# Patient Record
Sex: MALE | Race: BLACK OR AFRICAN AMERICAN | NOT HISPANIC OR LATINO | URBAN - METROPOLITAN AREA
[De-identification: names, ages, dates, MRNs, and addresses within clinical notes are randomized per-mention and may not be internally consistent; named-entity substitution may affect disease eponyms.]

---

## 2020-04-15 DIAGNOSIS — I77.819 AORTIC ECTASIA, UNSPECIFIED SITE: ICD-10-CM

## 2020-06-12 ENCOUNTER — INPATIENT (INPATIENT)
Facility: HOSPITAL | Age: 70
LOS: 4 days | Discharge: HOME HEALTH | End: 2020-06-17
Attending: INTERNAL MEDICINE | Admitting: INTERNAL MEDICINE
Payer: MEDICARE

## 2020-06-12 VITALS
DIASTOLIC BLOOD PRESSURE: 109 MMHG | RESPIRATION RATE: 17 BRPM | OXYGEN SATURATION: 99 % | HEART RATE: 66 BPM | TEMPERATURE: 97 F | SYSTOLIC BLOOD PRESSURE: 148 MMHG

## 2020-06-12 NOTE — ED ADULT TRIAGE NOTE - CHIEF COMPLAINT QUOTE
Pt BIBEMS from home for c.c SOB on exertion, worsening over past 2 days. Pt currently 99% RA. Denies CP. HX HTN, DM, and aortic valve replacement. .

## 2020-06-13 DIAGNOSIS — I50.23 ACUTE ON CHRONIC SYSTOLIC (CONGESTIVE) HEART FAILURE: ICD-10-CM

## 2020-06-13 DIAGNOSIS — R06.02 SHORTNESS OF BREATH: ICD-10-CM

## 2020-06-13 DIAGNOSIS — Z29.9 ENCOUNTER FOR PROPHYLACTIC MEASURES, UNSPECIFIED: ICD-10-CM

## 2020-06-13 DIAGNOSIS — E11.9 TYPE 2 DIABETES MELLITUS WITHOUT COMPLICATIONS: ICD-10-CM

## 2020-06-13 DIAGNOSIS — Z95.2 PRESENCE OF PROSTHETIC HEART VALVE: ICD-10-CM

## 2020-06-13 DIAGNOSIS — I10 ESSENTIAL (PRIMARY) HYPERTENSION: ICD-10-CM

## 2020-06-13 LAB
ALBUMIN SERPL ELPH-MCNC: 3.8 G/DL — SIGNIFICANT CHANGE UP (ref 3.3–5)
ALP SERPL-CCNC: 89 U/L — SIGNIFICANT CHANGE UP (ref 40–120)
ALT FLD-CCNC: 19 U/L — SIGNIFICANT CHANGE UP (ref 4–41)
ANION GAP SERPL CALC-SCNC: 14 MMO/L — SIGNIFICANT CHANGE UP (ref 7–14)
ANION GAP SERPL CALC-SCNC: 15 MMO/L — HIGH (ref 7–14)
APTT BLD: 31.6 SEC — SIGNIFICANT CHANGE UP (ref 27.5–36.3)
AST SERPL-CCNC: 28 U/L — SIGNIFICANT CHANGE UP (ref 4–40)
BASOPHILS # BLD AUTO: 0.05 K/UL — SIGNIFICANT CHANGE UP (ref 0–0.2)
BASOPHILS # BLD AUTO: 0.06 K/UL — SIGNIFICANT CHANGE UP (ref 0–0.2)
BASOPHILS NFR BLD AUTO: 0.7 % — SIGNIFICANT CHANGE UP (ref 0–2)
BASOPHILS NFR BLD AUTO: 0.9 % — SIGNIFICANT CHANGE UP (ref 0–2)
BILIRUB SERPL-MCNC: 2.1 MG/DL — HIGH (ref 0.2–1.2)
BUN SERPL-MCNC: 20 MG/DL — SIGNIFICANT CHANGE UP (ref 7–23)
BUN SERPL-MCNC: 21 MG/DL — SIGNIFICANT CHANGE UP (ref 7–23)
CALCIUM SERPL-MCNC: 9.7 MG/DL — SIGNIFICANT CHANGE UP (ref 8.4–10.5)
CALCIUM SERPL-MCNC: 9.8 MG/DL — SIGNIFICANT CHANGE UP (ref 8.4–10.5)
CHLORIDE SERPL-SCNC: 104 MMOL/L — SIGNIFICANT CHANGE UP (ref 98–107)
CHLORIDE SERPL-SCNC: 104 MMOL/L — SIGNIFICANT CHANGE UP (ref 98–107)
CO2 SERPL-SCNC: 22 MMOL/L — SIGNIFICANT CHANGE UP (ref 22–31)
CO2 SERPL-SCNC: 22 MMOL/L — SIGNIFICANT CHANGE UP (ref 22–31)
CREAT SERPL-MCNC: 1.2 MG/DL — SIGNIFICANT CHANGE UP (ref 0.5–1.3)
CREAT SERPL-MCNC: 1.2 MG/DL — SIGNIFICANT CHANGE UP (ref 0.5–1.3)
EOSINOPHIL # BLD AUTO: 0.02 K/UL — SIGNIFICANT CHANGE UP (ref 0–0.5)
EOSINOPHIL # BLD AUTO: 0.07 K/UL — SIGNIFICANT CHANGE UP (ref 0–0.5)
EOSINOPHIL NFR BLD AUTO: 0.3 % — SIGNIFICANT CHANGE UP (ref 0–6)
EOSINOPHIL NFR BLD AUTO: 0.9 % — SIGNIFICANT CHANGE UP (ref 0–6)
GLUCOSE SERPL-MCNC: 107 MG/DL — HIGH (ref 70–99)
GLUCOSE SERPL-MCNC: 131 MG/DL — HIGH (ref 70–99)
HBA1C BLD-MCNC: 6.5 % — HIGH (ref 4–5.6)
HCT VFR BLD CALC: 48.1 % — SIGNIFICANT CHANGE UP (ref 39–50)
HCT VFR BLD CALC: 48.8 % — SIGNIFICANT CHANGE UP (ref 39–50)
HGB BLD-MCNC: 15.3 G/DL — SIGNIFICANT CHANGE UP (ref 13–17)
HGB BLD-MCNC: 15.5 G/DL — SIGNIFICANT CHANGE UP (ref 13–17)
IMM GRANULOCYTES NFR BLD AUTO: 0.4 % — SIGNIFICANT CHANGE UP (ref 0–1.5)
IMM GRANULOCYTES NFR BLD AUTO: 0.4 % — SIGNIFICANT CHANGE UP (ref 0–1.5)
INR BLD: 1.65 — HIGH (ref 0.88–1.17)
LYMPHOCYTES # BLD AUTO: 0.9 K/UL — LOW (ref 1–3.3)
LYMPHOCYTES # BLD AUTO: 0.93 K/UL — LOW (ref 1–3.3)
LYMPHOCYTES # BLD AUTO: 12.1 % — LOW (ref 13–44)
LYMPHOCYTES # BLD AUTO: 13.2 % — SIGNIFICANT CHANGE UP (ref 13–44)
MAGNESIUM SERPL-MCNC: 2.3 MG/DL — SIGNIFICANT CHANGE UP (ref 1.6–2.6)
MCHC RBC-ENTMCNC: 29.6 PG — SIGNIFICANT CHANGE UP (ref 27–34)
MCHC RBC-ENTMCNC: 29.8 PG — SIGNIFICANT CHANGE UP (ref 27–34)
MCHC RBC-ENTMCNC: 31.8 % — LOW (ref 32–36)
MCHC RBC-ENTMCNC: 31.8 % — LOW (ref 32–36)
MCV RBC AUTO: 93 FL — SIGNIFICANT CHANGE UP (ref 80–100)
MCV RBC AUTO: 93.7 FL — SIGNIFICANT CHANGE UP (ref 80–100)
MONOCYTES # BLD AUTO: 0.54 K/UL — SIGNIFICANT CHANGE UP (ref 0–0.9)
MONOCYTES # BLD AUTO: 0.56 K/UL — SIGNIFICANT CHANGE UP (ref 0–0.9)
MONOCYTES NFR BLD AUTO: 7.3 % — SIGNIFICANT CHANGE UP (ref 2–14)
MONOCYTES NFR BLD AUTO: 7.9 % — SIGNIFICANT CHANGE UP (ref 2–14)
NEUTROPHILS # BLD AUTO: 5.25 K/UL — SIGNIFICANT CHANGE UP (ref 1.8–7.4)
NEUTROPHILS # BLD AUTO: 6.05 K/UL — SIGNIFICANT CHANGE UP (ref 1.8–7.4)
NEUTROPHILS NFR BLD AUTO: 77.3 % — HIGH (ref 43–77)
NEUTROPHILS NFR BLD AUTO: 78.6 % — HIGH (ref 43–77)
NRBC # FLD: 0 K/UL — SIGNIFICANT CHANGE UP (ref 0–0)
NRBC # FLD: 0 K/UL — SIGNIFICANT CHANGE UP (ref 0–0)
NT-PROBNP SERPL-SCNC: 2301 PG/ML — SIGNIFICANT CHANGE UP
PHOSPHATE SERPL-MCNC: 4 MG/DL — SIGNIFICANT CHANGE UP (ref 2.5–4.5)
PLATELET # BLD AUTO: 192 K/UL — SIGNIFICANT CHANGE UP (ref 150–400)
PLATELET # BLD AUTO: 220 K/UL — SIGNIFICANT CHANGE UP (ref 150–400)
PMV BLD: 12.1 FL — SIGNIFICANT CHANGE UP (ref 7–13)
PMV BLD: 12.1 FL — SIGNIFICANT CHANGE UP (ref 7–13)
POTASSIUM SERPL-MCNC: 4.6 MMOL/L — SIGNIFICANT CHANGE UP (ref 3.5–5.3)
POTASSIUM SERPL-MCNC: 4.8 MMOL/L — SIGNIFICANT CHANGE UP (ref 3.5–5.3)
POTASSIUM SERPL-SCNC: 4.6 MMOL/L — SIGNIFICANT CHANGE UP (ref 3.5–5.3)
POTASSIUM SERPL-SCNC: 4.8 MMOL/L — SIGNIFICANT CHANGE UP (ref 3.5–5.3)
PROT SERPL-MCNC: 7.3 G/DL — SIGNIFICANT CHANGE UP (ref 6–8.3)
PROTHROM AB SERPL-ACNC: 19.1 SEC — HIGH (ref 9.8–13.1)
RBC # BLD: 5.17 M/UL — SIGNIFICANT CHANGE UP (ref 4.2–5.8)
RBC # BLD: 5.21 M/UL — SIGNIFICANT CHANGE UP (ref 4.2–5.8)
RBC # FLD: 16.6 % — HIGH (ref 10.3–14.5)
RBC # FLD: 16.6 % — HIGH (ref 10.3–14.5)
SARS-COV-2 RNA SPEC QL NAA+PROBE: SIGNIFICANT CHANGE UP
SODIUM SERPL-SCNC: 140 MMOL/L — SIGNIFICANT CHANGE UP (ref 135–145)
SODIUM SERPL-SCNC: 141 MMOL/L — SIGNIFICANT CHANGE UP (ref 135–145)
TROPONIN T, HIGH SENSITIVITY: 39 NG/L — SIGNIFICANT CHANGE UP (ref ?–14)
TROPONIN T, HIGH SENSITIVITY: 41 NG/L — SIGNIFICANT CHANGE UP (ref ?–14)
TSH SERPL-MCNC: 2.98 UIU/ML — SIGNIFICANT CHANGE UP (ref 0.27–4.2)
WBC # BLD: 6.8 K/UL — SIGNIFICANT CHANGE UP (ref 3.8–10.5)
WBC # BLD: 7.69 K/UL — SIGNIFICANT CHANGE UP (ref 3.8–10.5)
WBC # FLD AUTO: 6.8 K/UL — SIGNIFICANT CHANGE UP (ref 3.8–10.5)
WBC # FLD AUTO: 7.69 K/UL — SIGNIFICANT CHANGE UP (ref 3.8–10.5)

## 2020-06-13 PROCEDURE — 71046 X-RAY EXAM CHEST 2 VIEWS: CPT | Mod: 26

## 2020-06-13 PROCEDURE — 99223 1ST HOSP IP/OBS HIGH 75: CPT

## 2020-06-13 PROCEDURE — 93306 TTE W/DOPPLER COMPLETE: CPT | Mod: 26

## 2020-06-13 RX ORDER — ASPIRIN/CALCIUM CARB/MAGNESIUM 324 MG
81 TABLET ORAL DAILY
Refills: 0 | Status: DISCONTINUED | OUTPATIENT
Start: 2020-06-13 | End: 2020-06-17

## 2020-06-13 RX ORDER — METOPROLOL TARTRATE 50 MG
200 TABLET ORAL DAILY
Refills: 0 | Status: DISCONTINUED | OUTPATIENT
Start: 2020-06-13 | End: 2020-06-17

## 2020-06-13 RX ORDER — ONDANSETRON 8 MG/1
4 TABLET, FILM COATED ORAL EVERY 6 HOURS
Refills: 0 | Status: DISCONTINUED | OUTPATIENT
Start: 2020-06-13 | End: 2020-06-17

## 2020-06-13 RX ORDER — LOSARTAN POTASSIUM 100 MG/1
100 TABLET, FILM COATED ORAL DAILY
Refills: 0 | Status: DISCONTINUED | OUTPATIENT
Start: 2020-06-13 | End: 2020-06-17

## 2020-06-13 RX ORDER — ATORVASTATIN CALCIUM 80 MG/1
10 TABLET, FILM COATED ORAL AT BEDTIME
Refills: 0 | Status: DISCONTINUED | OUTPATIENT
Start: 2020-06-13 | End: 2020-06-17

## 2020-06-13 RX ORDER — ENOXAPARIN SODIUM 100 MG/ML
40 INJECTION SUBCUTANEOUS DAILY
Refills: 0 | Status: DISCONTINUED | OUTPATIENT
Start: 2020-06-13 | End: 2020-06-15

## 2020-06-13 RX ORDER — METOPROLOL TARTRATE 50 MG
1 TABLET ORAL
Qty: 0 | Refills: 0 | DISCHARGE

## 2020-06-13 RX ORDER — ACETAMINOPHEN 500 MG
650 TABLET ORAL EVERY 6 HOURS
Refills: 0 | Status: DISCONTINUED | OUTPATIENT
Start: 2020-06-13 | End: 2020-06-17

## 2020-06-13 RX ORDER — FUROSEMIDE 40 MG
40 TABLET ORAL DAILY
Refills: 0 | Status: DISCONTINUED | OUTPATIENT
Start: 2020-06-13 | End: 2020-06-15

## 2020-06-13 RX ORDER — LOSARTAN POTASSIUM 100 MG/1
50 TABLET, FILM COATED ORAL DAILY
Refills: 0 | Status: DISCONTINUED | OUTPATIENT
Start: 2020-06-13 | End: 2020-06-13

## 2020-06-13 RX ORDER — FUROSEMIDE 40 MG
40 TABLET ORAL ONCE
Refills: 0 | Status: COMPLETED | OUTPATIENT
Start: 2020-06-13 | End: 2020-06-13

## 2020-06-13 RX ORDER — HYDROCHLOROTHIAZIDE 25 MG
12.5 TABLET ORAL DAILY
Refills: 0 | Status: DISCONTINUED | OUTPATIENT
Start: 2020-06-13 | End: 2020-06-15

## 2020-06-13 RX ADMIN — LOSARTAN POTASSIUM 100 MILLIGRAM(S): 100 TABLET, FILM COATED ORAL at 06:53

## 2020-06-13 RX ADMIN — Medication 40 MILLIGRAM(S): at 01:49

## 2020-06-13 RX ADMIN — ATORVASTATIN CALCIUM 10 MILLIGRAM(S): 80 TABLET, FILM COATED ORAL at 21:02

## 2020-06-13 RX ADMIN — Medication 81 MILLIGRAM(S): at 13:00

## 2020-06-13 RX ADMIN — Medication 200 MILLIGRAM(S): at 06:53

## 2020-06-13 RX ADMIN — Medication 12.5 MILLIGRAM(S): at 06:53

## 2020-06-13 RX ADMIN — Medication 40 MILLIGRAM(S): at 06:53

## 2020-06-13 RX ADMIN — ENOXAPARIN SODIUM 40 MILLIGRAM(S): 100 INJECTION SUBCUTANEOUS at 13:00

## 2020-06-13 NOTE — H&P ADULT - HISTORY OF PRESENT ILLNESS
Charlie Walters is a 70 year old man with a history of bioAVR, T2DM, HTN, BPH who presents for shortness of breath.      He reports he has had worsening abdominal fullness, shortness of breath, and lower extremity edema for about a year since he had his prostate surgery last July.  He has noticed some orthopnea and PND during that time.  His symptoms then worsened over the last few days with more shortness of breath even at rest.  He denies any fevers, chills, diarrhea but does not some gassy feeling in his chest/abdomen, constipation, poor appetite.  Due to these symptoms, he presented to [facility] ED for evaluation.    In the ED, he was afebrile, with unremarkable vital signs.  BNP was elevated.  He was given 40 IV lasix and admitted for further management.    On evaluation, he gave the above history.

## 2020-06-13 NOTE — H&P ADULT - PROBLEM SELECTOR PLAN 1
-Highest suspicion for heart failure with reduced ejection fraction.  Given history of aortic valve replacement, could also be issue with valve but lower suspicion.  Other items on the differential but less likely include COVID, PE, PNA which would not fit history as well.  -Obtain TTE to evaluate heart function and valve  -Diurese with lasix 40 IV qd.  Continue ARB for afterload reduction.  Continue metoprolol.  -Telemetry, I&Os, fluid restriction, daily weight

## 2020-06-13 NOTE — CONSULT NOTE ADULT - ATTENDING COMMENTS
Patient seen and examined, agree with the above assessment and plan by KHURRAM Torres.  HPI: 70 year old man with a history of bioAVR 2012, T2DM, HTN, BPH who presents for shortness of breath and increased PHELPS.   ECHO to eval LV function and bioprosthetic valve function  continue IV diuresis  ischemic eval pending echo results

## 2020-06-13 NOTE — H&P ADULT - NSICDXPASTSURGICALHX_GEN_ALL_CORE_FT
PAST SURGICAL HISTORY:  Aortic Valve Replaced 1/10/11    S/P Appendectomy     S/P Hemorrhoidectomy 26 yrs old    S/P Hernia Surgery right, 18 yrs old    S/P T&A

## 2020-06-13 NOTE — ED PROVIDER NOTE - PSH
Aortic Valve Replaced  1/10/11  S/P Appendectomy    S/P Hemorrhoidectomy  26 yrs old  S/P Hernia Surgery  right, 18 yrs old  S/P T&A

## 2020-06-13 NOTE — PROVIDER CONTACT NOTE (OTHER) - ASSESSMENT
patient is A&O4. vitals stable. no complaints of chest pain or headache. patient on tele monitor. tele sent EKG strip to floor at this time

## 2020-06-13 NOTE — H&P ADULT - NSHPREVIEWOFSYSTEMS_GEN_ALL_CORE
ROS:  Constitutional:  (+) none; (-) fevers, chills, sweats, unintentional weight loss, fatigue, sick contacts, recent travel, trauma  Ears/Nose/Mouth/Throat: (+) food stuck sensation even without eating (-) throat pain, rhinorrhea, dysphagia/odynophagia  CV: (+) lower extremity edema, orthopnea, PND (-) chest pain, palpitations,   Resp: (+) shortness of breath, (-)  cough  GI: (+) nausea, vomitting, constipation (-) abdominal pain, diarrhea, melana, hematochezia  : (+) none; (-) dysuria, hematuria  MSK: (+) none; (-) joint pain, joint swelling  Skin: (+) none; (-) rash, new yellowing/darkening of skin  Neuro: (+) none; (-) HA, vision changes, weakness, confusion, lightheadedness/dizziness  Endo: (+) none; (-) heat/cold intolerance, recent skin/hair/nail changes  Heme/Lymph: (+) none; (-) swollen lymph nodes, night sweats

## 2020-06-13 NOTE — H&P ADULT - NSHPLABSRESULTS_GEN_ALL_CORE
Diagnostics reviewed and remarkable for:  Lymphopenia 0.93, INR 1.65, tbili 2.1, trop 41, BNP 2301  CXR personally reviewed, slightly increased interstitial markings, possibly some cephalization, no focal opacities.  No mechanical valve visualized  EKG personally reviewed and NSR with PVC, Q waves in III, F, TWI in V4-6, I, AVL.  TWI/Q waves also in 8/2014 EKG Diagnostics reviewed and remarkable for:  Lymphopenia 0.93, INR 1.65, tbili 2.1, trop 41, BNP 2301  CXR personally reviewed, slightly increased interstitial markings, possibly some cephalization, no focal opacities.  No mechanical valve visualized.  Sternotomy wires present.  EKG personally reviewed and NSR with PVC, Q waves in III, F, TWI in V4-6, I, AVL.  TWI/Q waves also in 8/2014 EKG

## 2020-06-13 NOTE — H&P ADULT - PROBLEM SELECTOR PROBLEM 2
Addended by: DARIELA ADEN on: 2/28/2017 11:41 AM     Modules accepted: Orders     Essential hypertension

## 2020-06-13 NOTE — ED PROVIDER NOTE - PMH
AF (Paroxysmal Atrial Fibrillation)    Allergy, Food  coffee and food colors, rash and pigment formation of trunk  Dizziness  from 10/10  Gastric Ulcer  Hx of  Headache, Migraine  16 yrs old  Heart Murmur    HTN - Hypertension    Pulmonary Tuberculosis  age 18 yrs old, treated

## 2020-06-13 NOTE — ED PROVIDER NOTE - CLINICAL SUMMARY MEDICAL DECISION MAKING FREE TEXT BOX
[place] PGY3: 70M hx of aortic valve replacement, DM HTN presents with a cc of SOB, PHELPS, worsening last x2-3 weeks, a/w episodes of feeling lightheaded and dizzy, a/w nausea and no vomiting, does feel like something is "stuck" in back of throat, does note worsening b/l LE edema over same time as well and worsening abdominal fullness for "last year", no fevers exam vss non toxic appearing w b/l edema c/f chf vs renal disease vs worsening aortic valve dysfnx? will check labs cardiacs cxr consider cdu for echo

## 2020-06-13 NOTE — ED ADULT NURSE REASSESSMENT NOTE - NS ED NURSE REASSESS COMMENT FT1
Report received from FABIAN Verdugo. Pt aaox4. Vital signs reassessed as noted; MD aware of BP. Pt medicated as per MD order. Will continue to monitor.

## 2020-06-13 NOTE — CONSULT NOTE ADULT - ASSESSMENT
70 year old man with a history of bioAVR 2012, T2DM, HTN, BPH who presents for shortness of breath and increased PHELPS.     1. Acute CHF exacerbation   + volume overload on exam  improvement with IV lasix  continue lasix as ordered  check echo   continue bb, arb   ischemic eval pending echo results    2. AS s/p bioAVR  presenting with volume overload  continue diuresis  echo pending     3. HTN  bp elevated   increase hydrochlorothiazide to 25mg daily   c/w current meds    dvt ppx

## 2020-06-13 NOTE — H&P ADULT - NSICDXPASTMEDICALHX_GEN_ALL_CORE_FT
PAST MEDICAL HISTORY:  AF (Paroxysmal Atrial Fibrillation)     Allergy, Food coffee and food colors, rash and pigment formation of trunk    Dizziness from 10/10    Gastric Ulcer Hx of    Headache, Migraine 16 yrs old    Heart Murmur     HTN - Hypertension     Pulmonary Tuberculosis age 18 yrs old, treated

## 2020-06-13 NOTE — ED PROVIDER NOTE - PHYSICAL EXAMINATION
GEN APPEARANCE: WDWN, alert and cooperative, non-toxic appearing and in NAD  HEAD: Atraumatic, normocephalic   EYES: PERRLa, EOMI, vision grossly intact.   EARS: Gross hearing intact.   NOSE: No nasal discharge, no external evidence of epistaxis.   NECK: Supple  CV: RRR, S1S2, no c/r/m/g. No cyanosis or pallor. Extremities warm, well perfused. Cap refill <2 seconds. No bruits.   LUNGS: mild b/l diminished breath sounds.   ABDOMEN: Soft, NTND. No guarding or rebound. No masses.   MSK: Spine appears normal, no spine point tenderness. No CVA ttp. No joint erythema or tenderness. Normal muscular development. Pelvis stable.  EXTREMITIES: b/l +2 pitting peripheral edema. No obvious joint or bony deformity.  NEURO: Alert, follows commands. Weight bearing normal. Speech normal. Sensation and motor normal x4 extremities.   SKIN: Normal color for race, warm, dry and intact. No evidence of rash.  PSYCH: Normal mood and affect.

## 2020-06-13 NOTE — H&P ADULT - NSHPPHYSICALEXAM_GEN_ALL_CORE
Physical exam:  Vital signs reviewed as below:  T(C): 36.7 (06-13-20 @ 01:50), Max: 36.7 (06-13-20 @ 01:50)  HR: 73 (06-13-20 @ 01:50) (66 - 73)  BP: 150/110 (06-13-20 @ 01:50) (148/109 - 150/110)  RR: 18 (06-13-20 @ 01:50) (17 - 18)  SpO2: 100% (06-13-20 @ 01:50) (99% - 100%)  Constitutional-awake, alert, not in acute distress  Neuro-awake, alert, appropriately interactive, moving all extremities,   face symmetric  Eyes-pupils equally round and reactive to light, non icteric, no discharge noted  Ears, nose, mouth, throat-no abnormal discharge, moist mucous membranes, oropharynx clear without noted exudate  CV-regular rate and rhythm, no rubs, murmurs, gallops appreciated, 2+ b/l lower extremity edema  Resp-bibasilar crackles, mildly tachypneic, slightly increased work of breathing   GI-abdomen soft and nontender, no rebound or guarding present  -not examined  MSK-normal range of motion of bilateral elbows and knees, no obvious deformities   Skin-warm, dry, no rash appreciated  Psych-calm, cooperative, normal affect, not attending to internal stimuli

## 2020-06-13 NOTE — CONSULT NOTE ADULT - SUBJECTIVE AND OBJECTIVE BOX
CARDIOLOGY CONSULT - Dr. Velázquez     CHIEF COMPLAINT: sob, song     HPI: 70 year old man with a history of bioAVR 2012, T2DM, HTN, BPH who presents for shortness of breath and increased SONG.  He reports he has had worsening abdominal fullness, shortness of breath, and lower extremity edema for about a year since he had his prostate surgery last July.  He has noticed some orthopnea and PND during that time.  His symptoms then worsened over the last few days, . Pt. states he reports worsening SONG when walking up stares over the past few weeks, and within the last few days, reports SOB with minimal exertion, He states he felt similar prior to his AVR.   In the ED, he was afebrile, with unremarkable vital signs.  BNP was elevated.  He was given 40 IV lasix and admitted for further management.          PAST MEDICAL & SURGICAL HISTORY:  AF (Paroxysmal Atrial Fibrillation)  Dizziness: from 10/10  Allergy, Food: coffee and food colors, rash and pigment formation of trunk  Gastric Ulcer: Hx of  Pulmonary Tuberculosis: age 18 yrs old, treated  Headache, Migraine: 16 yrs old  HTN - Hypertension  Heart Murmur  Aortic Valve Replaced: 1/10/11  S/P Hemorrhoidectomy: 26 yrs old  S/P T&A  S/P Hernia Surgery: right, 18 yrs old  S/P Appendectomy          PREVIOUS DIAGNOSTIC TESTING:    [ ] Echocardiogram:   [ ]  Catheterization: < from: Cardiac Cath Lab (12.08.10 @ 10:54) >    [facility]  Department of Cardiology  [address] [place] [address]  [phone]  Cath Lab Report -- Comprehensive Report  Patient: FLORENCE WETZEL  Study date: 2010  Account number: [id number]  MR number: [id number]  : 1950  Gender: Male  Race:  Amer  Case Physician(s):  Ramiro Taylor M.D.  Fellow:  Marbella Miller M.D.  Referring Physician:  ORIN Harris M.D.  Indications: Cardiac: dyspnea and aortic valve disease.  History: Aortic valve: history of moderate to severe stenosis. The patient  has hypertension. Prior cardiovascular procedures: None.  Procedure:  Left coronary angiography.  Right coronary angiography.  Left heart catheterization with ventriculography.  Right heart catheterization.  Root aortography.  Technique: The risks and alternatives of the procedures and conscious  sedation were explained to the patient and informed consent was obtained.  Cardiac catheterization performed electively. Cath lab room 2.  Right femoral artery access. A 6FR SHEATH PINNACLE was inserted in the  vessel. Right femoral vein access. A 7FR SHEATH PINNACLE was inserted in  the vessel. Left coronary artery angiography. The vessel was injected  utilizing a 5FR FL4.0 EXPO catheter. For proper position, 5FR FL5 EXPO  catheter(s) were also used. Right coronary artery angiography. The vessel  was injected utilizing a 5FR FR4.0 EXPO catheter. Left heart  catheterization. Ventriculography was performed. A FRANKIE PIGTAIL  catheter was utilized. For proper position, 5FR  EXPO catheter(s)  were also used. After recording ascending aortic pressure, the catheter  was advanced across the aortic valve and left ventricular pressure was  recorded. Right heart catheterization. The procedure was performed  utilizing a 7FR SWAN DOMINIQUE catheter under fluoroscopic guidance. Root  aortography. A 5FR  EXPO catheter was placed and contrast was  injected. Contrast given: Omnipaque 97 ml. Fluoroscopy time: 10.6 min.  Medications given: Lidocaine 1 percent, SC.  Ventricles: Global left ventricular function was normal. EF estimated by  contrast ventriculography was 55 %.  Valves: Aortic valve: The valve was evaluated by hemodynamic measurement,  fluoroscopy, left ventriculography, and aortography. There was moderate to  severe aortic stenosis. There was moderate (2+/4+) aortic insufficiency.  Coronary vessels: The coronary circulation is right dominant.  LM:      Ostial LM: There was a discrete 20 % stenosis. The lesion was  smoothly contoured and concentric.  LAD:      Proximal LAD: There was a diffuse 20 % stenosis. The lesion was  irregularly contoured.  Mid LAD: There was a discrete 30 % stenosis just after S1.  Distal LAD: Angiography showed minorluminal irregularities with no flow  limiting lesions.  D1: Angiography showed minor luminal irregularities with no flow limiting  lesions.  D2: Angiography showed minor luminal irregularities with no flow limiting  lesions.  CX:      Circumflex: Angiography showed minor luminal irregularities with  no flow limiting lesions.  OM1: Normal.  RI:  Ramus intermedius: Normal.  RCA:      Mid RCA: There was a tubular 25 % stenosis. The lesion was  smoothly contoured and concentric.  RPDA: Normal.  RPLS:Normal.  Aorta: Ascending aorta: The segment was dilated.  Complications: There were no complications.  Summary:  Summary: Moderate to Severe AS (area 1.2, peak gradient  [id number]). Patient's increasing dyspnea is concerning for  physiological significant AS. Mild CAD. Normal LV function.  Recommendations:  CTS evaluation.  Prepared and signed by  FORTINO Miles    < end of copied text >    [ ] Stress Test:  	    MEDICATIONS:  MEDICATIONS  (STANDING):  aspirin enteric coated 81 milliGRAM(s) Oral daily  atorvastatin 10 milliGRAM(s) Oral at bedtime  enoxaparin Injectable 40 milliGRAM(s) SubCutaneous daily  furosemide   Injectable 40 milliGRAM(s) IV Push daily  hydrochlorothiazide 12.5 milliGRAM(s) Oral daily  losartan 100 milliGRAM(s) Oral daily  metoprolol succinate  milliGRAM(s) Oral daily      FAMILY HISTORY:  FH: diabetes mellitus  FH: stomach cancer      SOCIAL HISTORY:    [x ] Non-smoker  [ ] Smoker  [ ] Alcohol    Allergies    coffee and food color, brownish discoloration of trunk (Rash)  food allergy (Rash)  No Known Drug Allergies    Intolerances    	    REVIEW OF SYSTEMS:  CONSTITUTIONAL: No fever, weight loss, or fatigue  EYES: No eye pain, visual disturbances, or discharge  ENMT:  No difficulty hearing, tinnitus, vertigo; No sinus or throat pain  NECK: No pain or stiffness  RESPIRATORY: No cough, wheezing, chills or hemoptysis; + Shortness of Breath  CARDIOVASCULAR: No chest pain, palpitations, passing out, dizziness, or leg swelling  GASTROINTESTINAL: No abdominal or epigastric pain. No nausea, vomiting, or hematemesis; No diarrhea or constipation. No melena or hematochezia.  GENITOURINARY: No dysuria, frequency, hematuria, or incontinence  NEUROLOGICAL: No headaches, memory loss, loss of strength, numbness, or tremors  SKIN: No itching, burning, rashes, or lesions   	    [x] All others negative	  [ ] Unable to obtain    PHYSICAL EXAM:  T(C): 36.5 (20 @ 06:05), Max: 36.7 (20 @ 01:50)  HR: 83 (20 @ 06:05) (66 - 83)  BP: 161/99 (20 @ 06:05) (148/109 - 161/99)  RR: 18 (20 @ 06:05) (17 - 18)  SpO2: 100% (20 @ 06:05) (99% - 100%)  Wt(kg): --  I&O's Summary    2020 07:  -  2020 07:00  --------------------------------------------------------  IN: 100 mL / OUT: 450 mL / NET: -350 mL    2020 07:  -  2020 09:54  --------------------------------------------------------  IN: 150 mL / OUT: 450 mL / NET: -300 mL        Appearance: Normal	  Psychiatry: A & O x 3, Mood & affect appropriate  HEENT:   Normal oral mucosa, PERRL, EOMI	  Lymphatic: No lymphadenopathy  Cardiovascular: Normal S1 S2,RRR, No JVD  Respiratory: +crackles   Gastrointestinal:  Soft, Non-tender, + BS	  Skin: No rashes, No ecchymoses, No cyanosis	  Neurologic: Non-focal  Extremities: Normal range of motion, No clubbing, +b/l le edema, L>R  Vascular: Peripheral pulses palpable 2+ bilaterally    TELEMETRY: 	    ECG:  	NSR , TWI noted, unchanged from prior EKG 2014  RADIOLOGY: < from: Xray Chest 2 Views PA/Lat (20 @ 00:50) >    IMPRESSION:    Clear lungs.    < end of copied text >    OTHER: 	  	  LABS:	 	    CARDIAC MARKERS:                                  15.3   6.80  )-----------( 192      ( 2020 07:07 )             48.1     06-13    141  |  104  |  21  ----------------------------<  107<H>  4.6   |  22  |  1.20    Ca    9.8      2020 07:07  Phos  4.0     06-13  Mg     2.3     06-13    TPro  7.3  /  Alb  3.8  /  TBili  2.1<H>  /  DBili  x   /  AST  28  /  ALT  19  /  AlkPhos  89      PT/INR - ( 2020 00:07 )   PT: 19.1 SEC;   INR: 1.65          PTT - ( 2020 00:07 )  PTT:31.6 SEC  proBNP: Serum Pro-Brain Natriuretic Peptide: 2301 pg/mL ( @ 00:07)    Lipid Profile:   HgA1c:   TSH: Thyroid Stimulating Hormone, Serum: 2.98 uIU/mL ( @ 07:07)

## 2020-06-13 NOTE — ED PROVIDER NOTE - ATTENDING CONTRIBUTION TO CARE
MD Colbert:  patient seen and evaluated with the resident.  I was present for key portions of the History & Physical, and I agree with the Impression & Plan.  MD Colbert:  71 yo M, c/o 1wk progressively worsening SOB.  Porcine aortic valve repair 8yrs ago at [facility].  Intensity:  cannot do ADLS because so SOB.  Associated Sx: no F/c, no N/V/D, +pedal edema.  No CP.  VS: wnl.  Physical Exam: adult M, mild tachypnea, NCAT, PERRL, EOMI, neck supple, RRR, sternotomy appreciated, CTA B, Abd: s/nd/nt, Ext: 1+bipedal edema, Neuro: AAOx3, gait not assessed.  Impression:  SOB in context of Ao valve repair 8yrs ago is concerning for heart failure from valvular insufficiency.  Plan:  admit for cardiology admit, echo, lasix, reassess.

## 2020-06-13 NOTE — ED ADULT NURSE NOTE - OBJECTIVE STATEMENT
Shannon RN: Patient received in TR C. Patient A&OX4, ambulatory. Patient has hx of AFIB, HTN, Aortic Valve replacement. Patient reports to ED for SOB, worsening over the past few days, worsening with exertion. Patient reports "it feels like there is something stuck in my throat making it hard to swallow". Patient reports bloating of his stomach intermittently over past year, bloating noted to abdomen up palpation. Patient denies any pain or discomfort to chest or abdomen, patient denies nausea, vomiting, diarrhea, fever, chills. Patient placed on cardiac monitor, normal sinus rhythm noted. Patient placed on pulse ox, 99% room air. 20G placed in LT AC, labs collected and sent. MD at bedside for evaluation. Report given to Primary RN.

## 2020-06-13 NOTE — ED PROVIDER NOTE - OBJECTIVE STATEMENT
70M hx of aortic valve replacement, DM HTN presents with a cc of SOB, PHELPS, worsening last x2-3 weeks, a/w episodes of feeling lightheaded and dizzy, a/w nausea and no vomiting, does feel like something is "stuck" in back of throat, does note worsening b/l LE edema over same time as well and worsening abdominal fullness for "last year", no fevers. Intermittent cough. had stress earlier in year that demonstrated "larger heart". Denies /f/c/. Denies headache, syncope, lightheadedness, dizziness. Denies chest pain. Denies dysuria, hematuria, hematochezia, BRBPR, tarry stools, diarrhea, constipation.

## 2020-06-14 LAB
ANION GAP SERPL CALC-SCNC: 13 MMO/L — SIGNIFICANT CHANGE UP (ref 7–14)
BUN SERPL-MCNC: 23 MG/DL — SIGNIFICANT CHANGE UP (ref 7–23)
CALCIUM SERPL-MCNC: 9.3 MG/DL — SIGNIFICANT CHANGE UP (ref 8.4–10.5)
CHLORIDE SERPL-SCNC: 102 MMOL/L — SIGNIFICANT CHANGE UP (ref 98–107)
CO2 SERPL-SCNC: 24 MMOL/L — SIGNIFICANT CHANGE UP (ref 22–31)
CREAT SERPL-MCNC: 1.26 MG/DL — SIGNIFICANT CHANGE UP (ref 0.5–1.3)
GLUCOSE SERPL-MCNC: 106 MG/DL — HIGH (ref 70–99)
HCT VFR BLD CALC: 44.4 % — SIGNIFICANT CHANGE UP (ref 39–50)
HGB BLD-MCNC: 14.7 G/DL — SIGNIFICANT CHANGE UP (ref 13–17)
MAGNESIUM SERPL-MCNC: 2 MG/DL — SIGNIFICANT CHANGE UP (ref 1.6–2.6)
MCHC RBC-ENTMCNC: 30.1 PG — SIGNIFICANT CHANGE UP (ref 27–34)
MCHC RBC-ENTMCNC: 33.1 % — SIGNIFICANT CHANGE UP (ref 32–36)
MCV RBC AUTO: 90.8 FL — SIGNIFICANT CHANGE UP (ref 80–100)
NRBC # FLD: 0 K/UL — SIGNIFICANT CHANGE UP (ref 0–0)
PHOSPHATE SERPL-MCNC: 3.9 MG/DL — SIGNIFICANT CHANGE UP (ref 2.5–4.5)
PLATELET # BLD AUTO: 191 K/UL — SIGNIFICANT CHANGE UP (ref 150–400)
PMV BLD: 11.8 FL — SIGNIFICANT CHANGE UP (ref 7–13)
POTASSIUM SERPL-MCNC: 3.9 MMOL/L — SIGNIFICANT CHANGE UP (ref 3.5–5.3)
POTASSIUM SERPL-SCNC: 3.9 MMOL/L — SIGNIFICANT CHANGE UP (ref 3.5–5.3)
RBC # BLD: 4.89 M/UL — SIGNIFICANT CHANGE UP (ref 4.2–5.8)
RBC # FLD: 16.6 % — HIGH (ref 10.3–14.5)
SARS-COV-2 RNA SPEC QL NAA+PROBE: SIGNIFICANT CHANGE UP
SODIUM SERPL-SCNC: 139 MMOL/L — SIGNIFICANT CHANGE UP (ref 135–145)
WBC # BLD: 6.5 K/UL — SIGNIFICANT CHANGE UP (ref 3.8–10.5)
WBC # FLD AUTO: 6.5 K/UL — SIGNIFICANT CHANGE UP (ref 3.8–10.5)

## 2020-06-14 RX ORDER — LANOLIN ALCOHOL/MO/W.PET/CERES
3 CREAM (GRAM) TOPICAL AT BEDTIME
Refills: 0 | Status: DISCONTINUED | OUTPATIENT
Start: 2020-06-14 | End: 2020-06-17

## 2020-06-14 RX ADMIN — Medication 200 MILLIGRAM(S): at 05:06

## 2020-06-14 RX ADMIN — Medication 40 MILLIGRAM(S): at 05:07

## 2020-06-14 RX ADMIN — Medication 3 MILLIGRAM(S): at 21:07

## 2020-06-14 RX ADMIN — Medication 81 MILLIGRAM(S): at 13:30

## 2020-06-14 RX ADMIN — LOSARTAN POTASSIUM 100 MILLIGRAM(S): 100 TABLET, FILM COATED ORAL at 05:06

## 2020-06-14 RX ADMIN — ATORVASTATIN CALCIUM 10 MILLIGRAM(S): 80 TABLET, FILM COATED ORAL at 21:07

## 2020-06-14 RX ADMIN — Medication 12.5 MILLIGRAM(S): at 05:06

## 2020-06-14 RX ADMIN — ENOXAPARIN SODIUM 40 MILLIGRAM(S): 100 INJECTION SUBCUTANEOUS at 13:30

## 2020-06-14 NOTE — PROGRESS NOTE ADULT - SUBJECTIVE AND OBJECTIVE BOX
CC: no cp/sob    TELEMETRY:     PHYSICAL EXAM:    T(C): 36.8 (06-14-20 @ 05:02), Max: 37.1 (06-13-20 @ 12:59)  HR: 71 (06-14-20 @ 05:02) (67 - 76)  BP: 134/90 (06-14-20 @ 05:02) (119/85 - 147/108)  RR: 14 (06-14-20 @ 05:02) (14 - 19)  SpO2: 100% (06-14-20 @ 05:02) (98% - 100%)  Wt(kg): --  I&O's Summary    13 Jun 2020 07:01  -  14 Jun 2020 07:00  --------------------------------------------------------  IN: 1200 mL / OUT: 2625 mL / NET: -1425 mL        Appearance: Normal	  Cardiovascular: Normal S1 S2,RRR, No JVD, No murmurs  Respiratory: Lungs clear to auscultation	  Gastrointestinal:  Soft, Non-tender, + BS	  Extremities: Normal range of motion, No clubbing, cyanosis or edema  Vascular: Peripheral pulses palpable 2+ bilaterally     LABS:	 	                          14.7   6.50  )-----------( 191      ( 14 Jun 2020 07:12 )             44.4     06-14    139  |  102  |  23  ----------------------------<  106<H>  3.9   |  24  |  1.26    Ca    9.3      14 Jun 2020 07:12  Phos  3.9     06-14  Mg     2.0     06-14    TPro  7.3  /  Alb  3.8  /  TBili  2.1<H>  /  DBili  x   /  AST  28  /  ALT  19  /  AlkPhos  89  06-13      PT/INR - ( 13 Jun 2020 00:07 )   PT: 19.1 SEC;   INR: 1.65          PTT - ( 13 Jun 2020 00:07 )  PTT:31.6 SEC    CARDIAC MARKERS:

## 2020-06-14 NOTE — PROGRESS NOTE ADULT - ASSESSMENT
70 year old man with a history of bioAVR, T2DM, HTN, BPH who presents for shortness of breath.      Problem/Plan - 1:  ·  Problem: Shortness of breath.  Plan: -Highest suspicion for heart failure with reduced ejection fraction.  Given history of aortic valve replacement, could also be issue with valve but lower suspicion.  Other items on the differential but less likely include COVID, PE, PNA which would not fit history as well.  -Obtain TTE to evaluate heart function and valve  -Diurese with lasix 40 IV qd.  Continue ARB for afterload reduction.  Continue metoprolol.  -Telemetry, I&Os, fluid restriction, daily weight.     Problem/Plan - 2:  ·  Problem: Essential hypertension.  Plan: Continue home metop, HCTZ.  Losartan to replace valsartan due to formulary.     Problem/Plan - 3:  ·  Problem: Type 2 diabetes mellitus without complication, without long-term current use of insulin.  Plan: -Hold home oral medications  -SSI, acck, DM diet.   Problem/Plan - 4:  ·  Problem: Aortic valve replaced.  Plan: Hx of bioavr 2011.  TTE to evaluate valve function.     Problem/Plan - 5:  ·  Problem: Need for prophylactic measure.  Plan: Improve score 1.  Lovenox ppx.

## 2020-06-14 NOTE — PROGRESS NOTE ADULT - SUBJECTIVE AND OBJECTIVE BOX
Medicine Progress Note    Patient is a 70y old  Male who presents with a chief complaint of CC: shortness of breath (14 Jun 2020 09:19)      SUBJECTIVE / OVERNIGHT EVENTS:    ADDITIONAL REVIEW OF SYSTEMS:    MEDICATIONS  (STANDING):  aspirin enteric coated 81 milliGRAM(s) Oral daily  atorvastatin 10 milliGRAM(s) Oral at bedtime  enoxaparin Injectable 40 milliGRAM(s) SubCutaneous daily  furosemide   Injectable 40 milliGRAM(s) IV Push daily  hydrochlorothiazide 12.5 milliGRAM(s) Oral daily  losartan 100 milliGRAM(s) Oral daily  metoprolol succinate  milliGRAM(s) Oral daily    MEDICATIONS  (PRN):  acetaminophen   Tablet .. 650 milliGRAM(s) Oral every 6 hours PRN Temp greater or equal to 38C (100.4F), Mild Pain (1 - 3), Moderate Pain (4 - 6), Severe Pain (7 - 10)  ondansetron Injectable 4 milliGRAM(s) IV Push every 6 hours PRN Nausea    CAPILLARY BLOOD GLUCOSE        I&O's Summary    13 Jun 2020 07:01  -  14 Jun 2020 07:00  --------------------------------------------------------  IN: 1200 mL / OUT: 2625 mL / NET: -1425 mL    14 Jun 2020 07:01  -  14 Jun 2020 20:00  --------------------------------------------------------  IN: 0 mL / OUT: 700 mL / NET: -700 mL        PHYSICAL EXAM:  Vital Signs Last 24 Hrs  T(C): 36.4 (14 Jun 2020 17:22), Max: 36.9 (13 Jun 2020 21:01)  T(F): 97.6 (14 Jun 2020 17:22), Max: 98.4 (13 Jun 2020 21:01)  HR: 66 (14 Jun 2020 17:22) (66 - 72)  BP: 129/95 (14 Jun 2020 17:22) (119/85 - 134/90)  BP(mean): --  RR: 17 (14 Jun 2020 17:22) (14 - 17)  SpO2: 100% (14 Jun 2020 17:22) (98% - 100%)  CONSTITUTIONAL: NAD, well-developed, well-groomed  ENMT: Moist oral mucosa, no pharyngeal injection or exudates; normal dentition  RESPIRATORY: Normal respiratory effort; lungs are clear to auscultation bilaterally  CARDIOVASCULAR: Regular rate and rhythm, normal S1 and S2, no murmur/rub/gallop; No lower extremity edema; Peripheral pulses are 2+ bilaterally  ABDOMEN: Nontender to palpation, normoactive bowel sounds, no rebound/guarding; No hepatosplenomegaly  PSYCH: A+O to person, place, and time; affect appropriate  NEUROLOGY: CN 2-12 are intact and symmetric; no gross sensory deficits   SKIN: No rashes; no palpable lesions    LABS:                        14.7   6.50  )-----------( 191      ( 14 Jun 2020 07:12 )             44.4     06-14    139  |  102  |  23  ----------------------------<  106<H>  3.9   |  24  |  1.26    Ca    9.3      14 Jun 2020 07:12  Phos  3.9     06-14  Mg     2.0     06-14    TPro  7.3  /  Alb  3.8  /  TBili  2.1<H>  /  DBili  x   /  AST  28  /  ALT  19  /  AlkPhos  89  06-13    PT/INR - ( 13 Jun 2020 00:07 )   PT: 19.1 SEC;   INR: 1.65          PTT - ( 13 Jun 2020 00:07 )  PTT:31.6 SEC          COVID-19 PCR: NotDetec (14 Jun 2020 10:53)      RADIOLOGY & ADDITIONAL TESTS:  Imaging from Last 24 Hours:    Electrocardiogram/QTc Interval:    COORDINATION OF CARE:  Care Discussed with Consultants/Other Providers:  que

## 2020-06-14 NOTE — PROGRESS NOTE ADULT - ASSESSMENT
70 year old man with a history of bioAVR 2012, T2DM, HTN, BPH who presents for shortness of breath and increased PHELPS.     1. Acute Systolic CHF exacerbation   + volume status improving  continue lasix as ordered  echo reveals severe global LV dysfunction  interval decline from his prior surgery  plan for cardiac cath tomorrow  continue bb, arb       2. AS s/p bioAVR  presenting with volume overload  continue diuresis  echo reveals normal bioprosthetic function    3. HTN  bp elevated   increase hydrochlorothiazide to 25mg daily   c/w current meds    dvt ppx       ACP - Advanced Care Planning    -Advanced care planning discussed with the patient. Advanced care planning forms discussed with the patient and/or family.  Risks, benefits, and alternatives of medical/cardiac procedures were discussed in detail with all questions answered. Up to 30 minutes were spent addressing advanced care planning.

## 2020-06-15 ENCOUNTER — TRANSCRIPTION ENCOUNTER (OUTPATIENT)
Age: 70
End: 2020-06-15

## 2020-06-15 LAB
ANION GAP SERPL CALC-SCNC: 12 MMO/L — SIGNIFICANT CHANGE UP (ref 7–14)
APTT BLD: 31.6 SEC — SIGNIFICANT CHANGE UP (ref 27.5–36.3)
BLD GP AB SCN SERPL QL: NEGATIVE — SIGNIFICANT CHANGE UP
BUN SERPL-MCNC: 26 MG/DL — HIGH (ref 7–23)
CALCIUM SERPL-MCNC: 9.2 MG/DL — SIGNIFICANT CHANGE UP (ref 8.4–10.5)
CHLORIDE SERPL-SCNC: 101 MMOL/L — SIGNIFICANT CHANGE UP (ref 98–107)
CO2 SERPL-SCNC: 24 MMOL/L — SIGNIFICANT CHANGE UP (ref 22–31)
CREAT SERPL-MCNC: 1.3 MG/DL — SIGNIFICANT CHANGE UP (ref 0.5–1.3)
GLUCOSE SERPL-MCNC: 110 MG/DL — HIGH (ref 70–99)
MAGNESIUM SERPL-MCNC: 2 MG/DL — SIGNIFICANT CHANGE UP (ref 1.6–2.6)
PHOSPHATE SERPL-MCNC: 3.9 MG/DL — SIGNIFICANT CHANGE UP (ref 2.5–4.5)
POTASSIUM SERPL-MCNC: 4 MMOL/L — SIGNIFICANT CHANGE UP (ref 3.5–5.3)
POTASSIUM SERPL-SCNC: 4 MMOL/L — SIGNIFICANT CHANGE UP (ref 3.5–5.3)
RH IG SCN BLD-IMP: POSITIVE — SIGNIFICANT CHANGE UP
SODIUM SERPL-SCNC: 137 MMOL/L — SIGNIFICANT CHANGE UP (ref 135–145)

## 2020-06-15 PROCEDURE — 93010 ELECTROCARDIOGRAM REPORT: CPT

## 2020-06-15 RX ORDER — METOPROLOL TARTRATE 50 MG
5 TABLET ORAL ONCE
Refills: 0 | Status: COMPLETED | OUTPATIENT
Start: 2020-06-15 | End: 2020-06-15

## 2020-06-15 RX ORDER — HYDROCHLOROTHIAZIDE 25 MG
25 TABLET ORAL DAILY
Refills: 0 | Status: DISCONTINUED | OUTPATIENT
Start: 2020-06-15 | End: 2020-06-15

## 2020-06-15 RX ORDER — FUROSEMIDE 40 MG
40 TABLET ORAL DAILY
Refills: 0 | Status: DISCONTINUED | OUTPATIENT
Start: 2020-06-16 | End: 2020-06-17

## 2020-06-15 RX ORDER — CLOPIDOGREL BISULFATE 75 MG/1
75 TABLET, FILM COATED ORAL DAILY
Refills: 0 | Status: DISCONTINUED | OUTPATIENT
Start: 2020-06-16 | End: 2020-06-17

## 2020-06-15 RX ORDER — HEPARIN SODIUM 5000 [USP'U]/ML
INJECTION INTRAVENOUS; SUBCUTANEOUS
Qty: 25000 | Refills: 0 | Status: DISCONTINUED | OUTPATIENT
Start: 2020-06-15 | End: 2020-06-16

## 2020-06-15 RX ADMIN — HEPARIN SODIUM 1000 UNIT(S)/HR: 5000 INJECTION INTRAVENOUS; SUBCUTANEOUS at 22:49

## 2020-06-15 RX ADMIN — Medication 200 MILLIGRAM(S): at 17:23

## 2020-06-15 RX ADMIN — LOSARTAN POTASSIUM 100 MILLIGRAM(S): 100 TABLET, FILM COATED ORAL at 17:22

## 2020-06-15 RX ADMIN — Medication 40 MILLIGRAM(S): at 05:56

## 2020-06-15 RX ADMIN — Medication 81 MILLIGRAM(S): at 09:42

## 2020-06-15 RX ADMIN — Medication 3 MILLIGRAM(S): at 22:00

## 2020-06-15 RX ADMIN — Medication 5 MILLIGRAM(S): at 11:30

## 2020-06-15 RX ADMIN — ATORVASTATIN CALCIUM 10 MILLIGRAM(S): 80 TABLET, FILM COATED ORAL at 22:00

## 2020-06-15 NOTE — DIETITIAN INITIAL EVALUATION ADULT. - ADD RECOMMEND
1. Resume PO diet once clinically indicated; Once PO diet resumed, encourage/assist Pt with meals; monitor PO diet tolerance; honor food preferences;    2. Monitor labs, hydration status;       3. Suggest outpatient follow up with appropriate RDN for the purposes of long-term nutrition evaluation and diet education;

## 2020-06-15 NOTE — DISCHARGE NOTE PROVIDER - NSDCFUSCHEDAPPT_GEN_ALL_CORE_FT
FLORENCE WETZEL ; 06/17/2020 ; NPP Rad Cat 450 Opd Lkvl FLORENCE WETZEL ; 06/17/2020 ; P Rad Cat 450 Opd Lkvl

## 2020-06-15 NOTE — PROGRESS NOTE ADULT - ASSESSMENT
70 year old man with a history of bioAVR, T2DM, HTN, BPH who presents for shortness of breath.      Problem/Plan - 1:  ·  Problem: Shortness of breath.  Plan: -diuresis as per cards   -Diurese with lasix 40 IV qd.    - ischemia wuy as per cards     Problem/Plan - 2:  ·  Problem: Essential hypertension.  Plan: Continue home metop, HCTZ.  Losartan to replace valsartan due to formulary.     Problem/Plan - 3:  ·  Problem: Type 2 diabetes mellitus without complication, without long-term current use of insulin.  Plan: -Hold home oral medications  -SSI, acck, DM diet.     Problem/Plan - 4:  ·  Problem: Aortic valve replaced.  Plan: Hx of bioavr 2011.   cards fu

## 2020-06-15 NOTE — DISCHARGE NOTE PROVIDER - NSDCCPCAREPLAN_GEN_ALL_CORE_FT
PRINCIPAL DISCHARGE DIAGNOSIS  Diagnosis: Ischemic cardiomyopathy  Assessment and Plan of Treatment: You came in with shortness of breath. You were seen by cardiology. You were found to have ischemic cardiomyopathy on cardiac cath. You received stents for your heart and were started on Plavix. Please continue plavix as presribed and follow up with cardiology (Dr. Velázquez) as scheduled.  You came in with chest pain. Your ECG and cardiac enzymes were not indicative of any acute ischemic changes. You had an echo done which showed ____. You were seen by cardiology who recommended outpatient follow up.    Return to ED if any new or worsening symptoms such as worsenign headache, dizziness, chest pain, palpitations, shortness of breath, neck pain, jaw pain, arm pain, abdominal pain, nausea, or vomiting.      SECONDARY DISCHARGE DIAGNOSES  Diagnosis: Type 2 diabetes mellitus without complication, without long-term current use of insulin  Assessment and Plan of Treatment: Continue your medication regimen and a consistent carbohydrate diet (Meaning eating the same amount of carbohydrates at the same time each day). Monitor blood glucose levels throughout the day before meals and at bedtime. Record blood sugars and bring to outpatient providers appointment in order to be reviewed by your doctor for management modifications. If your sugars are more than 400 or less than 70 you should contact your PCP immediately. Monitor for signs/symptoms of low blood glucose, such as, dizziness, altered mental status, or cool/clammy skin. In addition, monitor for signs/symptoms of high blood glucose, such as, feeling hot, dry, fatigued, or with increased thirst/urination. Make regular podiatry appointments in order to have feet checked for wounds and uncontrolled toe nail growth to prevent infections, as well as, appointments with an ophthalmologist to monitor your vision.    Diagnosis: Essential hypertension  Assessment and Plan of Treatment: Continue blood pressure medication regimen as prescribed. Monitor for any visual changes, headachesz dizziness, chest pain or shortness of breath and return to ER if any such symptoms arise. Monitor blood pressure regularly. Please follow up with your primary care provider in 2 weeks    Diagnosis: Aortic valve replaced  Assessment and Plan of Treatment: Your bioprosthetic valve appears normal on echocardiogram. Please follow up with cardiology (Dr. Velázquez) as scheduled. PRINCIPAL DISCHARGE DIAGNOSIS  Diagnosis: Ischemic cardiomyopathy  Assessment and Plan of Treatment: You came in with shortness of breath. You were seen by cardiology. You were found to have ischemic cardiomyopathy on cardiac cath. You received stents for your heart and were started on Plavix. Please continue your lasix, blood pressure medications, plavix and aspirin as presribed and follow up with cardiology (Dr. Velázquez) as scheduled.   Return to ED if any new or worsening symptoms such as worsenign headache, dizziness, chest pain, palpitations, shortness of breath, neck pain, jaw pain, arm pain, abdominal pain, nausea, or vomiting.      SECONDARY DISCHARGE DIAGNOSES  Diagnosis: Type 2 diabetes mellitus without complication, without long-term current use of insulin  Assessment and Plan of Treatment: Continue your medication regimen and a consistent carbohydrate diet (Meaning eating the same amount of carbohydrates at the same time each day). Monitor blood glucose levels throughout the day before meals and at bedtime. Record blood sugars and bring to outpatient providers appointment in order to be reviewed by your doctor for management modifications. If your sugars are more than 400 or less than 70 you should contact your PCP immediately. Monitor for signs/symptoms of low blood glucose, such as, dizziness, altered mental status, or cool/clammy skin. In addition, monitor for signs/symptoms of high blood glucose, such as, feeling hot, dry, fatigued, or with increased thirst/urination. Make regular podiatry appointments in order to have feet checked for wounds and uncontrolled toe nail growth to prevent infections, as well as, appointments with an ophthalmologist to monitor your vision.    Diagnosis: Essential hypertension  Assessment and Plan of Treatment: Continue blood pressure medication regimen as prescribed. Monitor for any visual changes, headachesz dizziness, chest pain or shortness of breath and return to ER if any such symptoms arise. Monitor blood pressure regularly. Please follow up with your primary care provider in 2 weeks    Diagnosis: Aortic valve replaced  Assessment and Plan of Treatment: Your bioprosthetic valve appears normal on echocardiogram. Please follow up with cardiology (Dr. Velázquez) as scheduled. PRINCIPAL DISCHARGE DIAGNOSIS  Diagnosis: Acute on chronic congestive heart failure  Assessment and Plan of Treatment: You came in with shortness of breath. You were seen by cardiology. You were found to have cardiomyopathy and cardiac artery disease on cardiac cath. You received stents for your heart and were started on Plavix.   **Please continue your aspirin, plavix, atorvastatin, xarelto, metoprolol, losartan and lasix as prescribed, please do not continue your valsartan/HCTZ**   Return to ED if any new or worsening symptoms such as worsenign headache, dizziness, chest pain, palpitations, shortness of breath, neck pain, jaw pain, arm pain, abdominal pain, nausea, or vomiting.      SECONDARY DISCHARGE DIAGNOSES  Diagnosis: Type 2 diabetes mellitus without complication, without long-term current use of insulin  Assessment and Plan of Treatment: Continue your medication regimen and a consistent carbohydrate diet (Meaning eating the same amount of carbohydrates at the same time each day). Monitor blood glucose levels throughout the day before meals and at bedtime. Record blood sugars and bring to outpatient providers appointment in order to be reviewed by your doctor for management modifications. If your sugars are more than 400 or less than 70 you should contact your PCP immediately. Monitor for signs/symptoms of low blood glucose, such as, dizziness, altered mental status, or cool/clammy skin. In addition, monitor for signs/symptoms of high blood glucose, such as, feeling hot, dry, fatigued, or with increased thirst/urination. Make regular podiatry appointments in order to have feet checked for wounds and uncontrolled toe nail growth to prevent infections, as well as, appointments with an ophthalmologist to monitor your vision.    Diagnosis: Essential hypertension  Assessment and Plan of Treatment: Continue blood pressure medication regimen as prescribed.   **Please continue your aspirin, plavix, atorvastatin, xarelto, metoprolol, losartan and lasix as prescribed, please do not continue your valsartan/HCTZ**  Monitor for any visual changes, headachesz dizziness, chest pain or shortness of breath and return to ER if any such symptoms arise. Monitor blood pressure regularly. Please follow up with your primary care provider in 2 weeks    Diagnosis: Aortic valve replaced  Assessment and Plan of Treatment: Your bioprosthetic valve appears normal on echocardiogram. Please follow up with cardiology (Dr. Velázquez) as scheduled.    Diagnosis: Acute on chronic congestive heart failure  Assessment and Plan of Treatment: You came in with shortness of breath. You were seen by cardiology. You were found to have cardiomyopathy and cardiac artery disease on cardiac cath. You received stents for your heart and were started on Plavix.   **Please continue your aspirin, plavix, xarelto, metoprolol, losartan and lasix as prescribed**   Return to ED if any new or worsening symptoms such as worsenign headache, dizziness, chest pain, palpitations, shortness of breath, neck pain, jaw pain, arm pain, abdominal pain, nausea, or vomiting. PRINCIPAL DISCHARGE DIAGNOSIS  Diagnosis: Acute on chronic congestive heart failure  Assessment and Plan of Treatment: You came in with shortness of breath. You were seen by cardiology. You were found to have cardiomyopathy and cardiac artery disease on cardiac cath. You received stents for your heart and were started on Plavix.   **Please continue your aspirin, plavix, atorvastatin, xarelto, metoprolol, losartan and lasix as prescribed, please do not continue your valsartan/HCTZ**   Return to ED if any new or worsening symptoms such as worsenign headache, dizziness, chest pain, palpitations, shortness of breath, neck pain, jaw pain, arm pain, abdominal pain, nausea, or vomiting.      SECONDARY DISCHARGE DIAGNOSES  Diagnosis: Type 2 diabetes mellitus without complication, without long-term current use of insulin  Assessment and Plan of Treatment: Continue your medication regimen and a consistent carbohydrate diet (Meaning eating the same amount of carbohydrates at the same time each day). Monitor blood glucose levels throughout the day before meals and at bedtime. Record blood sugars and bring to outpatient providers appointment in order to be reviewed by your doctor for management modifications. If your sugars are more than 400 or less than 70 you should contact your PCP immediately. Monitor for signs/symptoms of low blood glucose, such as, dizziness, altered mental status, or cool/clammy skin. In addition, monitor for signs/symptoms of high blood glucose, such as, feeling hot, dry, fatigued, or with increased thirst/urination. Make regular podiatry appointments in order to have feet checked for wounds and uncontrolled toe nail growth to prevent infections, as well as, appointments with an ophthalmologist to monitor your vision.    Diagnosis: Essential hypertension  Assessment and Plan of Treatment: Continue blood pressure medication regimen as prescribed.   **Please continue your aspirin, plavix, atorvastatin, xarelto, metoprolol, losartan and lasix as prescribed, please do not continue your valsartan/HCTZ**  Monitor for any visual changes, headaches dizziness, chest pain or shortness of breath and return to ER if any such symptoms arise. Monitor blood pressure regularly. Please follow up with your primary care provider in 2 weeks    Diagnosis: Aortic valve replaced  Assessment and Plan of Treatment: Your bioprosthetic valve appears normal on echocardiogram. Please follow up with cardiology (Dr. Velázquez) as scheduled.    Diagnosis: Acute on chronic congestive heart failure  Assessment and Plan of Treatment: You came in with shortness of breath. You were seen by cardiology. You were found to have cardiomyopathy and cardiac artery disease on cardiac cath. You received stents for your heart and were started on Plavix.   **Please continue your aspirin, plavix, atorvastatin, xarelto, metoprolol, losartan and lasix as prescribed**   Return to ED if any new or worsening symptoms such as worsenign headache, dizziness, chest pain, palpitations, shortness of breath, neck pain, jaw pain, arm pain, abdominal pain, nausea, or vomiting.

## 2020-06-15 NOTE — DISCHARGE NOTE PROVIDER - NSDCFUADDAPPT_GEN_ALL_CORE_FT
outpatient follow up with appropriate RDN for the purposes of long-term nutrition evaluation and diet education    Please follow up with your primary care provider in 1 to 2 weeks for further care. If you don't have a primary care provider please follow up at our Medicine Clinic at  [address] [place] [address] [phone] or [phone]  (please call to make appointment) f/u scheduled for 6/23 @ 3:15pm     outpatient follow up with appropriate RDN for the purposes of long-term nutrition evaluation and diet education    Please follow up with your primary care provider in 1 to 2 weeks for further care. If you don't have a primary care provider please follow up at our Medicine Clinic at  [address] [place] [address] [phone] or [phone]  (please call to make appointment) Please follow up with cardiology as scheduled 6/23 @ 3:15pm     Follow up with your insurance company to find a local registered dietician to discuss healthy eating options for your heart.    Please follow up with your primary care provider in 1 to 2 weeks for further care. If you don't have a primary care provider please follow up at our Medicine Clinic at  [facility] [place] [address] [phone] or [phone]  (please call to make appointment)

## 2020-06-15 NOTE — PROGRESS NOTE ADULT - SUBJECTIVE AND OBJECTIVE BOX
Patient is a 70y old  Male who presents with a chief complaint of CC: shortness of breath (15 Cisco 2020 16:53)      INTERVAL HPI/OVERNIGHT EVENTS:  T(C): 36.6 (06-15-20 @ 17:15), Max: 36.8 (06-14-20 @ 21:05)  HR: 79 (06-15-20 @ 17:15) (64 - 80)  BP: 137/90 (06-15-20 @ 17:15) (105/71 - 137/90)  RR: 18 (06-15-20 @ 17:15) (16 - 18)  SpO2: 100% (06-15-20 @ 17:15) (95% - 100%)  Wt(kg): --  I&O's Summary    14 Jun 2020 07:01  -  15 Cisco 2020 07:00  --------------------------------------------------------  IN: 200 mL / OUT: 1700 mL / NET: -1500 mL    15 Cisco 2020 07:01  -  15 Cisco 2020 17:46  --------------------------------------------------------  IN: 0 mL / OUT: 1800 mL / NET: -1800 mL        LABS:                        14.7   6.50  )-----------( 191      ( 14 Jun 2020 07:12 )             44.4     06-15    137  |  101  |  26<H>  ----------------------------<  110<H>  4.0   |  24  |  1.30    Ca    9.2      15 Cisco 2020 06:33  Phos  3.9     06-15  Mg     2.0     06-15          CAPILLARY BLOOD GLUCOSE                MEDICATIONS  (STANDING):  aspirin enteric coated 81 milliGRAM(s) Oral daily  atorvastatin 10 milliGRAM(s) Oral at bedtime  enoxaparin Injectable 40 milliGRAM(s) SubCutaneous daily  losartan 100 milliGRAM(s) Oral daily  melatonin 3 milliGRAM(s) Oral at bedtime  metoprolol succinate  milliGRAM(s) Oral daily    MEDICATIONS  (PRN):  acetaminophen   Tablet .. 650 milliGRAM(s) Oral every 6 hours PRN Temp greater or equal to 38C (100.4F), Mild Pain (1 - 3), Moderate Pain (4 - 6), Severe Pain (7 - 10)  ondansetron Injectable 4 milliGRAM(s) IV Push every 6 hours PRN Nausea          PHYSICAL EXAM:  GENERAL: frail  CHEST/LUNG: Clear to percussion bilaterally; No rales, rhonchi, wheezing, or rubs  HEART: Regular rate and rhythm; No murmurs, rubs, or gallops  ABDOMEN: Soft, Nontender, Nondistended; Bowel sounds present  EXTREMITIES:  no edema     Care Discussed with Consultants/Other Providers [ ] YES  [ ] NO

## 2020-06-15 NOTE — CHART NOTE - NSCHARTNOTEFT_GEN_A_CORE
S/p LHC via R radial artery. TR band removed at 3p today but re-enforced for bleeding. Now without active bleeding, slight hematoma with good radial pulse. Will start heparin drip for ACS for untreated LAD. For staged PCI tomorrow.     Talha ROSADO

## 2020-06-15 NOTE — DISCHARGE NOTE PROVIDER - INSTRUCTIONS
Please limit fluid to less than 1.5 Liters a day - approximately 6 cups    low salt low cholesterol diet

## 2020-06-15 NOTE — DISCHARGE NOTE PROVIDER - PROVIDER TOKENS
PROVIDER:[TOKEN:[[id number]]] PROVIDER:[TOKEN:[[id number]]],PROVIDER:[TOKEN:[[id number]],FOLLOWUP:[1 week]] PROVIDER:[TOKEN:[[id number]],SCHEDULEDAPPT:[06/23/2020],SCHEDULEDAPPTTIME:[03:15 PM]],PROVIDER:[TOKEN:[[id number]],FOLLOWUP:[1 week]]

## 2020-06-15 NOTE — DIETITIAN INITIAL EVALUATION ADULT. - OTHER INFO
Pt 69 yo male with T2DM, bioAVR, HTN p/w shortness of breath - per chart. Of note Pt with Heart Failure. RDN attempted to assess, but Pt of the unit @ time of visit. Of note Pt NPO now, plan for cardiac cath today. Of note Pt's HbA1c level 6.5% (6/13). Unable to discuss diet or weight history @ present. No report of chewing or swallowing difficulty; no report of nausea, vomiting or diarrhea @ this time. +BM (6/12) - per flow sheet.

## 2020-06-15 NOTE — DISCHARGE NOTE PROVIDER - CARE PROVIDER_API CALL
Dann Velázquez  CARDIOLOGY  [address]  [place] [address]  Phone: [phone]  Fax: [phone]  Follow Up Time: Dann Velázquez  CARDIOLOGY  [address] [facility] [address]  [place] [address]  Phone: [phone]  Fax: [phone]  Follow Up Time:     Fran Chavira  HOSPICE/PALLIATIVE MEDICINE  [address] [place] [address]  Phone: [phone]  Fax: [phone]  Follow Up Time: 1 week Dann Velázquez  CARDIOLOGY  [address]  [place] [address]  Phone: [phone]  Fax: [phone]  Scheduled Appointment: 06/23/2020 03:15 PM    Fran Chavira  HOSPICE/PALLIATIVE MEDICINE  [address] [place] [address]  Phone: [phone]  Fax: [phone]  Follow Up Time: 1 week

## 2020-06-15 NOTE — DISCHARGE NOTE PROVIDER - HOSPITAL COURSE
70 year old man with a history of bioAVR 2012, T2DM, HTN, BPH who presents for shortness of breath and increased PHELPS.         Severe ischemic CM    - Cards on board    - Highest suspicion for heart failure with reduced ejection fraction    - Given history of aortic valve replacement, could also be issue with valve but lower suspicion    - Other items on the differential but less likely include COVID, PE, PNA which would not fit history as well.    - TTE 6/13 EF 15-20%, mild pHTN. Echo reveals normal bioprostetic function    - continue lasix, ARB, metoprolol    -CATH 6/15 - synergy stent mRCA 90, synergy stent dRCA 95; LCX 60, LAD 80; RRA access - stage II PCI of LAD/LCx 6/16    -ASA/Plavix         HTN    - metoprolol, HCTZ (inc to 25 qd 6/15)    - Losartan to replace valsartan due to formulary        DM2    - hgba1c 6.5    - sliding scale         AS s/p bioAVR    -presenting with volume overload    -continue diuresis    -echo reveals normal bioprosthetic function        On ___ this case was reviewed with  ____, the patient is medically stable and optimized for discharge. All medications were reviewed and prescriptions were sent to mutually agreed upon pharmacy. 70 year old man with a history of bioAVR 2012, T2DM, HTN, BPH who presents for shortness of breath and increased PHELPS.         Severe ischemic CM    - Cards on board    - Highest suspicion for heart failure with reduced ejection fraction    - Given history of aortic valve replacement, could also be issue with valve but lower suspicion    - Other items on the differential but less likely include COVID, PE, PNA which would not fit history as well.    - TTE 6/13 EF 15-20%, mild pHTN. Echo reveals normal bioprostetic function    - continue lasix, ARB, metoprolol    -CATH 6/15 - synergy stent mRCA 90, synergy stent dRCA 95; LCX 60, LAD 80; RRA acces     - stage II PCI of LAD/LCx 6/16 - s/p ROCIO to LAD    -ASA/Plavix     -outpatient cards follow up         HTN    - metoprolol, HCTZ (inc to 25 qd 6/15)    - Losartan to replace valsartan due to formulary        DM2    - hgba1c 6.5    - sliding scale         AS s/p bioAVR    -presenting with volume overload    -continue diuresis    -echo reveals normal bioprosthetic function        On ___ this case was reviewed with  ____, the patient is medically stable and optimized for discharge. All medications were reviewed and prescriptions were sent to mutually agreed upon pharmacy. 70 year old man with a history of bioAVR 2012, T2DM, HTN, BPH who presents for shortness of breath and increased PHELPS.             Severe ischemic CM    - Cards on board    - Highest suspicion for heart failure with reduced ejection fraction    - Given history of aortic valve replacement, could also be issue with valve but lower suspicion    - Other items on the differential but less likely include COVID, PE, PNA which would not fit history as well.    - TTE 6/13 EF 15-20%, mild pHTN. Echo reveals normal bioprostetic function    - continue lasix, ARB, metoprolol    -CATH 6/15 - synergy stent mRCA 90, synergy stent dRCA 95; LCX 60, LAD 80; RRA acces     - stage II PCI of LAD/LCx 6/16 - s/p ROCIO to LAD    -ASA/Plavix     -Volume status improved    -outpatient cards follow up         PAF    -developed PAF episode yesterday prior to cath    -dc hep gtt, start DOAC        AS s/p bioAVR    echo reveals normal bioprosthetic function        CAD s/p PCI     s/p LHC x 2 with ROCIO x 2 to LAD     continue DAP therapy    continue bb         HTN    - metoprolol, HCTZ (inc to 25 qd 6/15)    - Losartan to replace valsartan due to formulary        DM2    - hgba1c 6.5    - sliding scale         AS s/p bioAVR    -presenting with volume overload    -continue diuresis    -echo reveals normal bioprosthetic function        On ___ this case was reviewed with  ____, the patient is medically stable and optimized for discharge. All medications were reviewed and prescriptions were sent to mutually agreed upon pharmacy. 70 year old man with a history of bioAVR 2012, T2DM, HTN, BPH who presents for shortness of breath and increased PHELPS.         Acute congestive heart failure exacerbation    - Pt. with history of AS s/p bioAVR    - Cardiology consulted    - COVID swab negative x 2    - TTE 6/13 with ejection fraction 15-20%, mild pHTN. Echo reveals normal bioprosthetic function    - started IV lasix, ARB, continued metoprolol. Transitioned to PO Lasix    - Pt. underwent cardiac cath 6/15 - synergy stent placed in mRCA, synergy stent placed in dRCA. Stage II PCI of LAD/LCx done 6/16 with ROCIO placed in LAD    - Pt. was started on a heparin gtts which was discontinued at time of discharge    - Plavix started inpatient. Aspirin was continued    - Volume status improved    - outpatient cardiology follow up scheduled 6/23 @ 3:15pm     - Pt. cleared for discharge from cardiology standpoint        paroxysmal afib    Pt. developed an episode of paroxysmal a fib yesterday prior to cath    -Pt. was started on a heparin gtts which was discontinued at time of discharge    -Pt. will start Xarelto 15mg qd on discharge         AS s/p bioAVR    -Pt. was diuresed inpatient with improvement of volume status    -echo reveals normal bioprosthetic function        CAD s/p PCI     -s/p PCI with ROCIO prox RCA, RPDA and ROCIO LAD    -continue DAP therapy    -continue metoprolol, losartan, lasix, aspirin, plavix, statin and xarelto on discharge        HTN    - will continue metoprolol, lasix and losartan on discharge. Pt. to not take Valsartan/HCTZ        On 6/17/2020 this case was reviewed with Dr. Velázquez and Dr. Chavira, the patient is medically stable and optimized for discharge. All medications were reviewed and prescriptions were sent to mutually agreed upon pharmacy.

## 2020-06-15 NOTE — DISCHARGE NOTE PROVIDER - NSDCMRMEDTOKEN_GEN_ALL_CORE_FT
aspirin 81 mg oral tablet: 1 tab(s) orally once a day  atorvastatin 10 mg oral tablet: 1 tab(s) orally once a day  glipiZIDE 2.5 mg oral tablet, extended release: 1 tab(s) orally once a day  metFORMIN 500 mg oral tablet: 1 tab(s) orally 2 times a day  metoprolol succinate 200 mg oral tablet, extended release: 1 tab(s) orally once a day  valsartan-hydrochlorothiazide 160mg-12.5mg oral tablet: 1 tab(s) orally once a day aspirin 81 mg oral tablet: 1 tab(s) orally once a day  atorvastatin 10 mg oral tablet: 1 tab(s) orally once a day  glipiZIDE 2.5 mg oral tablet, extended release: 1 tab(s) orally once a day  metFORMIN 500 mg oral tablet: 1 tab(s) orally 2 times a day  metoprolol succinate 200 mg oral tablet, extended release: 1 tab(s) orally once a day  rivaroxaban 15 mg oral tablet: 1 tab(s) orally once a day (in the evening)   please check coverage [phone]  valsartan-hydrochlorothiazide 160mg-12.5mg oral tablet: 1 tab(s) orally once a day aspirin 81 mg oral tablet: 1 tab(s) orally once a day  atorvastatin 10 mg oral tablet: 1 tab(s) orally once a day  Eliquis 5 mg oral tablet: 1 tab(s) orally 2 times a day   please price check [id number],  glipiZIDE 2.5 mg oral tablet, extended release: 1 tab(s) orally once a day  metFORMIN 500 mg oral tablet: 1 tab(s) orally 2 times a day  metoprolol succinate 200 mg oral tablet, extended release: 1 tab(s) orally once a day  rivaroxaban 15 mg oral tablet: 1 tab(s) orally once a day (in the evening)   please check coverage [phone]  valsartan-hydrochlorothiazide 160mg-12.5mg oral tablet: 1 tab(s) orally once a day aspirin 81 mg oral tablet: 1 tab(s) orally once a day  atorvastatin 10 mg oral tablet: 1 tab(s) orally once a day  Eliquis 5 mg oral tablet: 1 tab(s) orally 2 times a day   please price check [phone],  glipiZIDE 2.5 mg oral tablet, extended release: 1 tab(s) orally once a day  metFORMIN 500 mg oral tablet: 1 tab(s) orally 2 times a day  metoprolol succinate 200 mg oral tablet, extended release: 1 tab(s) orally once a day  rivaroxaban 15 mg oral tablet: 1 tab(s) orally once a day (in the evening)   please check coverage [phone]  valsartan-hydrochlorothiazide 160mg-12.5mg oral tablet: 1 tab(s) orally once a day aspirin 81 mg oral tablet: 1 tab(s) orally once a day  atorvastatin 10 mg oral tablet: 1 tab(s) orally once a day  clopidogrel 75 mg oral tablet: 1 tab(s) orally once a day  furosemide 40 mg oral tablet: 1 tab(s) orally once a day  glipiZIDE 2.5 mg oral tablet, extended release: 1 tab(s) orally once a day  losartan 100 mg oral tablet: 1 tab(s) orally once a day  melatonin 3 mg oral tablet: 1 tab(s) orally once a day (at bedtime)  metFORMIN 500 mg oral tablet: 1 tab(s) orally 2 times a day  metoprolol succinate 200 mg oral tablet, extended release: 1 tab(s) orally once a day  rivaroxaban 15 mg oral tablet: 1 tab(s) orally once a day (in the evening)   please check coverage [phone] aspirin 81 mg oral tablet: 1 tab(s) orally once a day  atorvastatin 10 mg oral tablet: 1 tab(s) orally once a day  clopidogrel 75 mg oral tablet: 1 tab(s) orally once a day  furosemide 40 mg oral tablet: 1 tab(s) orally once a day  glipiZIDE 2.5 mg oral tablet, extended release: 1 tab(s) orally once a day  losartan 100 mg oral tablet: 1 tab(s) orally once a day  melatonin 3 mg oral tablet: 1 tab(s) orally once a day (at bedtime)  metFORMIN 500 mg oral tablet: 1 tab(s) orally 2 times a day  metoprolol succinate 200 mg oral tablet, extended release: 1 tab(s) orally once a day  rivaroxaban 15 mg oral tablet: 1 tab(s) orally once a day (in the evening)   please check coverage [phone]

## 2020-06-16 LAB
ANION GAP SERPL CALC-SCNC: 13 MMO/L — SIGNIFICANT CHANGE UP (ref 7–14)
APTT BLD: 65.9 SEC — HIGH (ref 27.5–36.3)
BASOPHILS # BLD AUTO: 0.04 K/UL — SIGNIFICANT CHANGE UP (ref 0–0.2)
BASOPHILS NFR BLD AUTO: 0.7 % — SIGNIFICANT CHANGE UP (ref 0–2)
BUN SERPL-MCNC: 24 MG/DL — HIGH (ref 7–23)
CALCIUM SERPL-MCNC: 9.1 MG/DL — SIGNIFICANT CHANGE UP (ref 8.4–10.5)
CHLORIDE SERPL-SCNC: 101 MMOL/L — SIGNIFICANT CHANGE UP (ref 98–107)
CO2 SERPL-SCNC: 27 MMOL/L — SIGNIFICANT CHANGE UP (ref 22–31)
CREAT SERPL-MCNC: 1.05 MG/DL — SIGNIFICANT CHANGE UP (ref 0.5–1.3)
EOSINOPHIL # BLD AUTO: 0.17 K/UL — SIGNIFICANT CHANGE UP (ref 0–0.5)
EOSINOPHIL NFR BLD AUTO: 2.9 % — SIGNIFICANT CHANGE UP (ref 0–6)
GLUCOSE SERPL-MCNC: 108 MG/DL — HIGH (ref 70–99)
HCT VFR BLD CALC: 41.5 % — SIGNIFICANT CHANGE UP (ref 39–50)
HGB BLD-MCNC: 13.9 G/DL — SIGNIFICANT CHANGE UP (ref 13–17)
IMM GRANULOCYTES NFR BLD AUTO: 0.5 % — SIGNIFICANT CHANGE UP (ref 0–1.5)
LYMPHOCYTES # BLD AUTO: 0.75 K/UL — LOW (ref 1–3.3)
LYMPHOCYTES # BLD AUTO: 12.6 % — LOW (ref 13–44)
MAGNESIUM SERPL-MCNC: 2.1 MG/DL — SIGNIFICANT CHANGE UP (ref 1.6–2.6)
MCHC RBC-ENTMCNC: 30.2 PG — SIGNIFICANT CHANGE UP (ref 27–34)
MCHC RBC-ENTMCNC: 33.5 % — SIGNIFICANT CHANGE UP (ref 32–36)
MCV RBC AUTO: 90 FL — SIGNIFICANT CHANGE UP (ref 80–100)
MONOCYTES # BLD AUTO: 0.53 K/UL — SIGNIFICANT CHANGE UP (ref 0–0.9)
MONOCYTES NFR BLD AUTO: 8.9 % — SIGNIFICANT CHANGE UP (ref 2–14)
NEUTROPHILS # BLD AUTO: 4.42 K/UL — SIGNIFICANT CHANGE UP (ref 1.8–7.4)
NEUTROPHILS NFR BLD AUTO: 74.4 % — SIGNIFICANT CHANGE UP (ref 43–77)
NRBC # FLD: 0 K/UL — SIGNIFICANT CHANGE UP (ref 0–0)
PHOSPHATE SERPL-MCNC: 3.7 MG/DL — SIGNIFICANT CHANGE UP (ref 2.5–4.5)
PLATELET # BLD AUTO: 187 K/UL — SIGNIFICANT CHANGE UP (ref 150–400)
PMV BLD: 12 FL — SIGNIFICANT CHANGE UP (ref 7–13)
POTASSIUM SERPL-MCNC: 3.2 MMOL/L — LOW (ref 3.5–5.3)
POTASSIUM SERPL-SCNC: 3.2 MMOL/L — LOW (ref 3.5–5.3)
RBC # BLD: 4.61 M/UL — SIGNIFICANT CHANGE UP (ref 4.2–5.8)
RBC # FLD: 16.3 % — HIGH (ref 10.3–14.5)
RH IG SCN BLD-IMP: POSITIVE — SIGNIFICANT CHANGE UP
SODIUM SERPL-SCNC: 141 MMOL/L — SIGNIFICANT CHANGE UP (ref 135–145)
WBC # BLD: 5.94 K/UL — SIGNIFICANT CHANGE UP (ref 3.8–10.5)
WBC # FLD AUTO: 5.94 K/UL — SIGNIFICANT CHANGE UP (ref 3.8–10.5)

## 2020-06-16 PROCEDURE — 93010 ELECTROCARDIOGRAM REPORT: CPT

## 2020-06-16 RX ORDER — HEPARIN SODIUM 5000 [USP'U]/ML
INJECTION INTRAVENOUS; SUBCUTANEOUS
Qty: 25000 | Refills: 0 | Status: DISCONTINUED | OUTPATIENT
Start: 2020-06-16 | End: 2020-06-17

## 2020-06-16 RX ORDER — POTASSIUM CHLORIDE 20 MEQ
40 PACKET (EA) ORAL EVERY 4 HOURS
Refills: 0 | Status: COMPLETED | OUTPATIENT
Start: 2020-06-16 | End: 2020-06-16

## 2020-06-16 RX ADMIN — Medication 40 MILLIEQUIVALENT(S): at 19:39

## 2020-06-16 RX ADMIN — HEPARIN SODIUM 1000 UNIT(S)/HR: 5000 INJECTION INTRAVENOUS; SUBCUTANEOUS at 22:54

## 2020-06-16 RX ADMIN — Medication 40 MILLIGRAM(S): at 05:53

## 2020-06-16 RX ADMIN — Medication 3 MILLIGRAM(S): at 21:30

## 2020-06-16 RX ADMIN — Medication 200 MILLIGRAM(S): at 05:53

## 2020-06-16 RX ADMIN — Medication 40 MILLIEQUIVALENT(S): at 22:56

## 2020-06-16 RX ADMIN — LOSARTAN POTASSIUM 100 MILLIGRAM(S): 100 TABLET, FILM COATED ORAL at 05:53

## 2020-06-16 RX ADMIN — CLOPIDOGREL BISULFATE 75 MILLIGRAM(S): 75 TABLET, FILM COATED ORAL at 09:39

## 2020-06-16 RX ADMIN — Medication 81 MILLIGRAM(S): at 09:39

## 2020-06-16 RX ADMIN — HEPARIN SODIUM 1000 UNIT(S)/HR: 5000 INJECTION INTRAVENOUS; SUBCUTANEOUS at 09:39

## 2020-06-16 RX ADMIN — ATORVASTATIN CALCIUM 10 MILLIGRAM(S): 80 TABLET, FILM COATED ORAL at 21:30

## 2020-06-16 NOTE — CHART NOTE - NSCHARTNOTEFT_GEN_A_CORE
FLORENCE WETZEL  MRN-[id number] 70y    Patient status post cath via R femoral access: Site clean, dry, intact. No hematoma or active bleeding. Extremities warm to touch. Pulses present b/l, capillary refill appropriate. Pt voiding without discomfort. Denies numbness or tingling. Will continue to follow closely. FLORENCE WETZEL  MRN-[id number] 70y    Patient status post cath via R femoral access: Site clean, dry, intact. No hematoma or active bleeding. Extremities warm to touch. Pulses present b/l, capillary refill appropriate. Pt voiding without discomfort. Denies numbness or tingling. Hep gtt restarted.     Will continue to follow closely.

## 2020-06-16 NOTE — PROGRESS NOTE ADULT - SUBJECTIVE AND OBJECTIVE BOX
Patient is a 70y old  Male who presents with a chief complaint of CC: shortness of breath (15 Cisco 2020 17:46)      INTERVAL HPI/OVERNIGHT EVENTS:  T(C): 36.4 (06-16-20 @ 10:18), Max: 36.7 (06-16-20 @ 01:47)  HR: 98 (06-16-20 @ 10:18) (70 - 98)  BP: 130/81 (06-16-20 @ 10:18) (106/64 - 132/97)  RR: 18 (06-16-20 @ 10:18) (14 - 18)  SpO2: 96% (06-16-20 @ 10:18) (96% - 100%)  Wt(kg): --  I&O's Summary    15 Cisco 2020 07:01  -  16 Jun 2020 07:00  --------------------------------------------------------  IN: 100 mL / OUT: 2500 mL / NET: -2400 mL        LABS:                        13.9   5.94  )-----------( 187      ( 16 Jun 2020 07:00 )             41.5     06-16    141  |  101  |  24<H>  ----------------------------<  108<H>  3.2<L>   |  27  |  1.05    Ca    9.1      16 Jun 2020 07:00  Phos  3.7     06-16  Mg     2.1     06-16      PTT - ( 16 Jun 2020 07:00 )  PTT:65.9 SEC    CAPILLARY BLOOD GLUCOSE                MEDICATIONS  (STANDING):  aspirin enteric coated 81 milliGRAM(s) Oral daily  atorvastatin 10 milliGRAM(s) Oral at bedtime  clopidogrel Tablet 75 milliGRAM(s) Oral daily  furosemide    Tablet 40 milliGRAM(s) Oral daily  losartan 100 milliGRAM(s) Oral daily  melatonin 3 milliGRAM(s) Oral at bedtime  metoprolol succinate  milliGRAM(s) Oral daily  potassium chloride    Tablet ER 40 milliEquivalent(s) Oral every 4 hours    MEDICATIONS  (PRN):  acetaminophen   Tablet .. 650 milliGRAM(s) Oral every 6 hours PRN Temp greater or equal to 38C (100.4F), Mild Pain (1 - 3), Moderate Pain (4 - 6), Severe Pain (7 - 10)  ondansetron Injectable 4 milliGRAM(s) IV Push every 6 hours PRN Nausea          PHYSICAL EXAM:  GENERAL: NAD, well-groomed, well-developed  HEAD:  Atraumatic, Normocephalic  CHEST/LUNG: Clear to percussion bilaterally; No rales, rhonchi, wheezing, or rubs  HEART: Regular rate and rhythm; No murmurs, rubs, or gallops  ABDOMEN: Soft, Nontender, Nondistended; Bowel sounds present  EXTREMITIES:  2+ Peripheral Pulses, No clubbing, cyanosis, or edema  LYMPH: No lymphadenopathy noted  SKIN: No rashes or lesions    Care Discussed with Consultants/Other Providers [ ] YES  [ ] NO

## 2020-06-17 ENCOUNTER — TRANSCRIPTION ENCOUNTER (OUTPATIENT)
Age: 70
End: 2020-06-17

## 2020-06-17 ENCOUNTER — APPOINTMENT (OUTPATIENT)
Dept: CT IMAGING | Facility: IMAGING CENTER | Age: 70
End: 2020-06-17

## 2020-06-17 VITALS
RESPIRATION RATE: 16 BRPM | HEART RATE: 67 BPM | OXYGEN SATURATION: 99 % | TEMPERATURE: 98 F | SYSTOLIC BLOOD PRESSURE: 125 MMHG | DIASTOLIC BLOOD PRESSURE: 85 MMHG

## 2020-06-17 LAB
ANION GAP SERPL CALC-SCNC: 16 MMO/L — HIGH (ref 7–14)
APTT BLD: 78.6 SEC — HIGH (ref 27.5–36.3)
APTT BLD: 89.1 SEC — HIGH (ref 27.5–36.3)
BASOPHILS # BLD AUTO: 0.06 K/UL — SIGNIFICANT CHANGE UP (ref 0–0.2)
BASOPHILS NFR BLD AUTO: 0.8 % — SIGNIFICANT CHANGE UP (ref 0–2)
BUN SERPL-MCNC: 19 MG/DL — SIGNIFICANT CHANGE UP (ref 7–23)
CALCIUM SERPL-MCNC: 9.6 MG/DL — SIGNIFICANT CHANGE UP (ref 8.4–10.5)
CHLORIDE SERPL-SCNC: 100 MMOL/L — SIGNIFICANT CHANGE UP (ref 98–107)
CO2 SERPL-SCNC: 22 MMOL/L — SIGNIFICANT CHANGE UP (ref 22–31)
CREAT SERPL-MCNC: 1.06 MG/DL — SIGNIFICANT CHANGE UP (ref 0.5–1.3)
EOSINOPHIL # BLD AUTO: 0.13 K/UL — SIGNIFICANT CHANGE UP (ref 0–0.5)
EOSINOPHIL NFR BLD AUTO: 1.8 % — SIGNIFICANT CHANGE UP (ref 0–6)
GLUCOSE SERPL-MCNC: 156 MG/DL — HIGH (ref 70–99)
HCT VFR BLD CALC: 49.7 % — SIGNIFICANT CHANGE UP (ref 39–50)
HCT VFR BLD CALC: 49.7 % — SIGNIFICANT CHANGE UP (ref 39–50)
HGB BLD-MCNC: 15.7 G/DL — SIGNIFICANT CHANGE UP (ref 13–17)
HGB BLD-MCNC: 15.7 G/DL — SIGNIFICANT CHANGE UP (ref 13–17)
IMM GRANULOCYTES NFR BLD AUTO: 0.4 % — SIGNIFICANT CHANGE UP (ref 0–1.5)
LYMPHOCYTES # BLD AUTO: 0.82 K/UL — LOW (ref 1–3.3)
LYMPHOCYTES # BLD AUTO: 11.1 % — LOW (ref 13–44)
MAGNESIUM SERPL-MCNC: 2.1 MG/DL — SIGNIFICANT CHANGE UP (ref 1.6–2.6)
MCHC RBC-ENTMCNC: 29.3 PG — SIGNIFICANT CHANGE UP (ref 27–34)
MCHC RBC-ENTMCNC: 29.3 PG — SIGNIFICANT CHANGE UP (ref 27–34)
MCHC RBC-ENTMCNC: 31.6 % — LOW (ref 32–36)
MCHC RBC-ENTMCNC: 31.6 % — LOW (ref 32–36)
MCV RBC AUTO: 92.7 FL — SIGNIFICANT CHANGE UP (ref 80–100)
MCV RBC AUTO: 92.7 FL — SIGNIFICANT CHANGE UP (ref 80–100)
MONOCYTES # BLD AUTO: 0.73 K/UL — SIGNIFICANT CHANGE UP (ref 0–0.9)
MONOCYTES NFR BLD AUTO: 9.9 % — SIGNIFICANT CHANGE UP (ref 2–14)
NEUTROPHILS # BLD AUTO: 5.62 K/UL — SIGNIFICANT CHANGE UP (ref 1.8–7.4)
NEUTROPHILS NFR BLD AUTO: 76 % — SIGNIFICANT CHANGE UP (ref 43–77)
NRBC # FLD: 0 K/UL — SIGNIFICANT CHANGE UP (ref 0–0)
NRBC # FLD: 0 K/UL — SIGNIFICANT CHANGE UP (ref 0–0)
PHOSPHATE SERPL-MCNC: 3.4 MG/DL — SIGNIFICANT CHANGE UP (ref 2.5–4.5)
PLATELET # BLD AUTO: 179 K/UL — SIGNIFICANT CHANGE UP (ref 150–400)
PLATELET # BLD AUTO: 179 K/UL — SIGNIFICANT CHANGE UP (ref 150–400)
PMV BLD: 11.8 FL — SIGNIFICANT CHANGE UP (ref 7–13)
PMV BLD: 11.8 FL — SIGNIFICANT CHANGE UP (ref 7–13)
POTASSIUM SERPL-MCNC: 5 MMOL/L — SIGNIFICANT CHANGE UP (ref 3.5–5.3)
POTASSIUM SERPL-SCNC: 5 MMOL/L — SIGNIFICANT CHANGE UP (ref 3.5–5.3)
RBC # BLD: 5.36 M/UL — SIGNIFICANT CHANGE UP (ref 4.2–5.8)
RBC # BLD: 5.36 M/UL — SIGNIFICANT CHANGE UP (ref 4.2–5.8)
RBC # FLD: 16.7 % — HIGH (ref 10.3–14.5)
RBC # FLD: 16.7 % — HIGH (ref 10.3–14.5)
SODIUM SERPL-SCNC: 138 MMOL/L — SIGNIFICANT CHANGE UP (ref 135–145)
WBC # BLD: 7.39 K/UL — SIGNIFICANT CHANGE UP (ref 3.8–10.5)
WBC # BLD: 7.39 K/UL — SIGNIFICANT CHANGE UP (ref 3.8–10.5)
WBC # FLD AUTO: 7.39 K/UL — SIGNIFICANT CHANGE UP (ref 3.8–10.5)
WBC # FLD AUTO: 7.39 K/UL — SIGNIFICANT CHANGE UP (ref 3.8–10.5)

## 2020-06-17 RX ORDER — LANOLIN ALCOHOL/MO/W.PET/CERES
1 CREAM (GRAM) TOPICAL
Qty: 30 | Refills: 0
Start: 2020-06-17 | End: 2020-07-16

## 2020-06-17 RX ORDER — RIVAROXABAN 15 MG-20MG
1 KIT ORAL
Qty: 30 | Refills: 0
Start: 2020-06-17 | End: 2020-07-16

## 2020-06-17 RX ORDER — APIXABAN 2.5 MG/1
1 TABLET, FILM COATED ORAL
Qty: 60 | Refills: 0
Start: 2020-06-17 | End: 2020-07-16

## 2020-06-17 RX ORDER — FUROSEMIDE 40 MG
1 TABLET ORAL
Qty: 30 | Refills: 0
Start: 2020-06-17 | End: 2020-07-16

## 2020-06-17 RX ORDER — LOSARTAN POTASSIUM 100 MG/1
1 TABLET, FILM COATED ORAL
Qty: 30 | Refills: 0
Start: 2020-06-17 | End: 2020-07-16

## 2020-06-17 RX ORDER — CLOPIDOGREL BISULFATE 75 MG/1
1 TABLET, FILM COATED ORAL
Qty: 30 | Refills: 0
Start: 2020-06-17 | End: 2020-07-16

## 2020-06-17 RX ADMIN — CLOPIDOGREL BISULFATE 75 MILLIGRAM(S): 75 TABLET, FILM COATED ORAL at 13:28

## 2020-06-17 RX ADMIN — LOSARTAN POTASSIUM 100 MILLIGRAM(S): 100 TABLET, FILM COATED ORAL at 13:28

## 2020-06-17 RX ADMIN — Medication 200 MILLIGRAM(S): at 13:28

## 2020-06-17 RX ADMIN — Medication 40 MILLIGRAM(S): at 13:28

## 2020-06-17 RX ADMIN — Medication 81 MILLIGRAM(S): at 13:28

## 2020-06-17 NOTE — DISCHARGE NOTE NURSING/CASE MANAGEMENT/SOCIAL WORK - NSSCNAMETXT_GEN_ALL_CORE
[facility]. Nurse to visit on the day following discharge. Other appropriate services to be arranged thereafter.   Please contact the home care agency at the above phone number if you have not heard from them by approximately 12 noon on the day after your hospital discharge.

## 2020-06-17 NOTE — PROGRESS NOTE ADULT - ASSESSMENT
70 year old man with a history of bioAVR 2012, T2DM, HTN, BPH who presents for shortness of breath and increased PHELPS.     1. Acute Systolic CHF exacerbation   volume status improved   continue lasix as ordered  echo reveals severe global LV dysfunction  continue bb, arb     2. CAD s/p PCI   s/p LHC x 2 with ROCIO x 2 to LAD   continue DAP therapy  continue bb     3. AS s/p bioAVR  echo reveals normal bioprosthetic function    3. HTN  bp stable  c/w current meds    dvt ppx   pt stable for dc with f/u in our office in next 2 weeks. 70 year old man with a history of bioAVR 2012, T2DM, HTN, BPH who presents for shortness of breath and increased PHELPS.     1. Acute Systolic CHF exacerbation   volume status improved   continue lasix as ordered  echo reveals severe global LV dysfunction  continue bb, arb     2. CAD s/p PCI   s/p LHC x 2 with ROCIO x 2 to LAD   continue DAP therapy  continue bb     3. AS s/p bioAVR  echo reveals normal bioprosthetic function    3. HTN  bp stable  c/w current meds    dvt ppx   pt stable for dc with f/u scheduled for 6/23 @ 3:15pm 70 year old man with a history of bioAVR 2012, T2DM, HTN, BPH who presents for shortness of breath and increased PHELPS.     1. Acute Systolic CHF exacerbation   volume status improved   continue lasix as ordered  echo reveals severe global LV dysfunction  continue bb, arb     2. CAD s/p PCI   s/p LHC x 2 with ROCIO x 2 to LAD   continue DAP therapy  dc hep gtt   continue bb     3. AS s/p bioAVR  echo reveals normal bioprosthetic function    3. HTN  bp stable  c/w current meds    dvt ppx   pt stable for dc with f/u scheduled for 6/23 @ 3:15pm 70 year old man with a history of bioAVR 2012, T2DM, HTN, BPH who presents for shortness of breath and increased PHELPS.     1. Acute Systolic CHF exacerbation   volume status improved   continue lasix as ordered  echo reveals severe global LV dysfunction  continue bb, arb     2. CAD s/p PCI   s/p LHC x 2 with ROCIO x 2 to LAD   continue DAP therapy  continue bb     3. AS s/p bioAVR  echo reveals normal bioprosthetic function    4. PAF  pt. developed PAF episode yesterday prior to cath  dc hep gtt, start DOAC    5. HTN  bp stable  c/w current meds    dvt ppx   pt stable for dc with f/u scheduled for 6/23 @ 3:15pm

## 2020-06-17 NOTE — PHYSICAL THERAPY INITIAL EVALUATION ADULT - PERTINENT HX OF CURRENT PROBLEM, REHAB EVAL
PAtient is a 70 year old male admitted for SOB due to CHF, now s/p cardiac cath with right femoral access. PMH listed below

## 2020-06-17 NOTE — PROGRESS NOTE ADULT - SUBJECTIVE AND OBJECTIVE BOX
CARDIOLOGY FOLLOW UP - Dr. Velázquez    CC no cp/sob       PHYSICAL EXAM:  T(C): 36.6 (20 @ 09:35), Max: 37.2 (20 @ 18:50)  HR: 73 (20 @ 09:35) (62 - 98)  BP: 131/80 (20 @ 09:35) (108/69 - 131/80)  RR: 17 (20 @ 09:35) (16 - 18)  SpO2: 98% (20 @ 09:35) (96% - 100%)  Wt(kg): --  I&O's Summary    2020 07:01  -  2020 07:00  --------------------------------------------------------  IN: 480 mL / OUT: 100 mL / NET: 380 mL        Appearance: Normal	  Cardiovascular: Normal S1 S2,RRR, No JVD, No murmurs  Respiratory: Lungs clear to auscultation	  Gastrointestinal:  Soft, Non-tender, + BS	  Extremities: Normal range of motion, No clubbing, cyanosis or edema        MEDICATIONS  (STANDING):  aspirin enteric coated 81 milliGRAM(s) Oral daily  atorvastatin 10 milliGRAM(s) Oral at bedtime  clopidogrel Tablet 75 milliGRAM(s) Oral daily  furosemide    Tablet 40 milliGRAM(s) Oral daily  heparin  Infusion.  Unit(s)/Hr (10 mL/Hr) IV Continuous <Continuous>  losartan 100 milliGRAM(s) Oral daily  melatonin 3 milliGRAM(s) Oral at bedtime  metoprolol succinate  milliGRAM(s) Oral daily      TELEMETRY: nsr 	    ECG:  	  RADIOLOGY:   DIAGNOSTIC TESTING:  [ ] Echocardiogram:  [ ]  Catheterization: < from: Cardiac Cath Lab - Adult (20 @ 11:36) >    [facility]  [address] [place] [id number] [phone]  Cath Lab Report -- Comprehensive Report  Patient: FLORENCE WETZEL  Study date: 2020  Account number: [id number]  MR number: [id number]  : 1950  Gender: Male  Race: B  Case Physician(s):  Dann Velázquez M.D.  Referring Physician:  INDICATIONS: Patient presents for planned staged PCI to the LAD in the  setting of new onset systolic heart failure and left ventricular  dysfunction. Unstable angina - CCS3.  PROCEDURE:  --  Intervention on mid LAD: drug-eluting stent.  TECHNIQUE: The risks and alternatives of the procedures and conscious  sedation were explained to the patient and informed consent was obtained.  Cardiac catheterization performed electively. Coronary intervention  performed electively.  Local anesthetic given. Right femoral artery access. RADIATION EXPOSURE:  7.6 min. A drug-eluting stent was performed on the 80 % lesion in the mid  LAD. Following intervention there was an excellent angiographic appearance  with a 1 % residual stenosis. Vessel setup was performed. A 6 Fr. EBU 3.5  Launcher guiding catheter was used to intubate the vessel. Vessel setup  was performed. A 180cm Runthrough NS Wire wire was used to cross the  lesion. Balloon angioplasty was performed, using a 2.5 X 12 Euphora RX  balloon, with 1 inflations and a maximum inflation pressure of 10 tip. A  3.0 X 12 Synergy drug-eluting stent was placed across the lesion and  deployed at a maximum inflation pressure of 16 tip.  CONTRAST GIVEN: 64 ml Optiray.  MEDICATIONS GIVEN: Fentanyl, 25 mcg, IV. Midazolam, 1 mg, IV. 2% Lidocaine,  10 ml, subcutaneously. Bivalirudin (Angiomax) 250 mg/NS 50 mL, 13.4 ml,  IV. Bivalirudin (Angiomax) 250 mg/NS 50 mL,infusion rate of 1.75  mg/kg/hr, IV. 0.9% Normal saline, 90 ml, IV.  CORONARY VESSELS:  LAD:   --  Mid LAD: There was a 80 % stenosis.  COMPLICATIONS: There were no complications.  DIAGNOSTIC IMPRESSIONS: Patient s/p successful staged PCI to the mid LAD.  DIAGNOSTIC RECOMMENDATIONS: Continue dual antiplatelet therapy.  Resume anticoagulation this evening six hours after sheath removal.  Medical management of LCX disease.  INTERVENTIONAL IMPRESSIONS: Patient s/p successful staged PCI to the mid  LAD.  INTERVENTIONAL RECOMMENDATIONS: Continue dual antiplatelet therapy.  Resume anticoagulation this evening six hours after sheath removal.  Medical management of LCX disease.  DISPOSITION: The patient left the catheterization laboratory in stable  condition.  Prepared and signed by  Dann Velázquez M.D.    < end of copied text >    [ ] Stress Test:    OTHER: 	    LABS:	 	                                15.7   7.39  )-----------( 179      ( 2020 04:33 )             49.7     06-16    141  |  101  |  24<H>  ----------------------------<  108<H>  3.2<L>   |  27  |  1.05    Ca    9.1      2020 07:00  Phos  3.7     06-16  Mg     2.1     06-16      PTT - ( 2020 04:33 )  PTT:78.6 SEC

## 2020-06-17 NOTE — DISCHARGE NOTE NURSING/CASE MANAGEMENT/SOCIAL WORK - NSDCFUADDAPPT_GEN_ALL_CORE_FT
Please follow up with cardiology as scheduled 6/23 @ 3:15pm     Follow up with your insurance company to find a local registered dietician to discuss healthy eating options for your heart.    Please follow up with your primary care provider in 1 to 2 weeks for further care. If you don't have a primary care provider please follow up at our Medicine Clinic at  [facility] [place] [address] [phone] or [phone]  (please call to make appointment)

## 2020-06-17 NOTE — DISCHARGE NOTE NURSING/CASE MANAGEMENT/SOCIAL WORK - PATIENT PORTAL LINK FT
You can access the FollowMyHealth Patient Portal offered by [facility] by registering at the following website: [email or web address]. By joining [employer]’s FollowMyHealth portal, you will also be able to view your health information using other applications (apps) compatible with our system.

## 2020-06-17 NOTE — PROGRESS NOTE ADULT - ATTENDING COMMENTS
agree with above  CV stable s/p PCI to RCA and LAD  Start DOAC for PAF   cont medical mgmt including triple therapy for at least 3 months  DC Home  outpt followup with me in 2 weeks

## 2020-06-17 NOTE — PHYSICAL THERAPY INITIAL EVALUATION ADULT - CRITERIA FOR SKILLED THERAPEUTIC INTERVENTIONS
anticipated equipment needs at discharge/predicted duration of therapy intervention/functional limitations in following categories/therapy frequency/anticipated discharge recommendation/impairments found/risk reduction/prevention/rehab potential

## 2020-06-17 NOTE — PROGRESS NOTE ADULT - REASON FOR ADMISSION
CC: shortness of breath

## 2021-01-01 NOTE — PROGRESS NOTE ADULT - ASSESSMENT
70 year old man with a history of bioAVR, T2DM, HTN, BPH who presents for shortness of breath.      Problem/Plan - 1:  ·  Problem: Shortness of breath.  Plan: -diuresis as per cards   -Diurese as per cards   - ischemia medina as per cards     Problem/Plan - 2:  ·  Problem: Essential hypertension.  Plan: Continue current meds     Problem/Plan - 3:  ·  Problem: Type 2 diabetes mellitus without complication, without long-term current use of insulin.  Plan: -Hold home oral medications  -SSI, acck, DM diet.     Problem/Plan - 4:  ·  Problem: Aortic valve replaced.  Plan: Hx of bioavr 2011.   cards fu No

## 2021-08-03 ENCOUNTER — INPATIENT (INPATIENT)
Facility: HOSPITAL | Age: 71
LOS: 8 days | Discharge: HOME HEALTH | End: 2021-08-12
Attending: INTERNAL MEDICINE | Admitting: INTERNAL MEDICINE
Payer: MEDICARE

## 2021-08-03 VITALS
DIASTOLIC BLOOD PRESSURE: 81 MMHG | OXYGEN SATURATION: 100 % | SYSTOLIC BLOOD PRESSURE: 130 MMHG | RESPIRATION RATE: 16 BRPM | HEIGHT: 71 IN | TEMPERATURE: 99 F | HEART RATE: 84 BPM

## 2021-08-03 DIAGNOSIS — R18.8 OTHER ASCITES: ICD-10-CM

## 2021-08-03 DIAGNOSIS — R63.8 OTHER SYMPTOMS AND SIGNS CONCERNING FOOD AND FLUID INTAKE: ICD-10-CM

## 2021-08-03 DIAGNOSIS — I10 ESSENTIAL (PRIMARY) HYPERTENSION: ICD-10-CM

## 2021-08-03 DIAGNOSIS — K25.7 CHRONIC GASTRIC ULCER WITHOUT HEMORRHAGE OR PERFORATION: ICD-10-CM

## 2021-08-03 DIAGNOSIS — I50.33 ACUTE ON CHRONIC DIASTOLIC (CONGESTIVE) HEART FAILURE: ICD-10-CM

## 2021-08-03 DIAGNOSIS — I50.9 HEART FAILURE, UNSPECIFIED: ICD-10-CM

## 2021-08-03 DIAGNOSIS — I48.0 PAROXYSMAL ATRIAL FIBRILLATION: ICD-10-CM

## 2021-08-03 LAB
ALBUMIN SERPL ELPH-MCNC: 4.1 G/DL — SIGNIFICANT CHANGE UP (ref 3.3–5)
ALP SERPL-CCNC: 129 U/L — HIGH (ref 40–120)
ALT FLD-CCNC: 11 U/L — SIGNIFICANT CHANGE UP (ref 4–41)
ANION GAP SERPL CALC-SCNC: 15 MMOL/L — HIGH (ref 7–14)
APTT BLD: 34 SEC — SIGNIFICANT CHANGE UP (ref 27–36.3)
AST SERPL-CCNC: 26 U/L — SIGNIFICANT CHANGE UP (ref 4–40)
BASOPHILS # BLD AUTO: 0.07 K/UL — SIGNIFICANT CHANGE UP (ref 0–0.2)
BASOPHILS NFR BLD AUTO: 1 % — SIGNIFICANT CHANGE UP (ref 0–2)
BILIRUB SERPL-MCNC: 1.2 MG/DL — SIGNIFICANT CHANGE UP (ref 0.2–1.2)
BLOOD GAS VENOUS COMPREHENSIVE RESULT: SIGNIFICANT CHANGE UP
BUN SERPL-MCNC: 18 MG/DL — SIGNIFICANT CHANGE UP (ref 7–23)
CALCIUM SERPL-MCNC: 9.9 MG/DL — SIGNIFICANT CHANGE UP (ref 8.4–10.5)
CHLORIDE SERPL-SCNC: 102 MMOL/L — SIGNIFICANT CHANGE UP (ref 98–107)
CO2 SERPL-SCNC: 24 MMOL/L — SIGNIFICANT CHANGE UP (ref 22–31)
CREAT SERPL-MCNC: 1 MG/DL — SIGNIFICANT CHANGE UP (ref 0.5–1.3)
EOSINOPHIL # BLD AUTO: 0.38 K/UL — SIGNIFICANT CHANGE UP (ref 0–0.5)
EOSINOPHIL NFR BLD AUTO: 5.6 % — SIGNIFICANT CHANGE UP (ref 0–6)
GLUCOSE SERPL-MCNC: 86 MG/DL — SIGNIFICANT CHANGE UP (ref 70–99)
HCT VFR BLD CALC: 43.7 % — SIGNIFICANT CHANGE UP (ref 39–50)
HGB BLD-MCNC: 13.8 G/DL — SIGNIFICANT CHANGE UP (ref 13–17)
IANC: 5.33 K/UL — SIGNIFICANT CHANGE UP (ref 1.5–8.5)
IMM GRANULOCYTES NFR BLD AUTO: 0.4 % — SIGNIFICANT CHANGE UP (ref 0–1.5)
INR BLD: 1.44 RATIO — HIGH (ref 0.88–1.16)
LYMPHOCYTES # BLD AUTO: 0.52 K/UL — LOW (ref 1–3.3)
LYMPHOCYTES # BLD AUTO: 7.6 % — LOW (ref 13–44)
MCHC RBC-ENTMCNC: 30.3 PG — SIGNIFICANT CHANGE UP (ref 27–34)
MCHC RBC-ENTMCNC: 31.6 GM/DL — LOW (ref 32–36)
MCV RBC AUTO: 96 FL — SIGNIFICANT CHANGE UP (ref 80–100)
MONOCYTES # BLD AUTO: 0.5 K/UL — SIGNIFICANT CHANGE UP (ref 0–0.9)
MONOCYTES NFR BLD AUTO: 7.3 % — SIGNIFICANT CHANGE UP (ref 2–14)
NEUTROPHILS # BLD AUTO: 5.33 K/UL — SIGNIFICANT CHANGE UP (ref 1.8–7.4)
NEUTROPHILS NFR BLD AUTO: 78.1 % — HIGH (ref 43–77)
NRBC # BLD: 0 /100 WBCS — SIGNIFICANT CHANGE UP
NRBC # FLD: 0 K/UL — SIGNIFICANT CHANGE UP
NT-PROBNP SERPL-SCNC: 3902 PG/ML — HIGH
PLATELET # BLD AUTO: 221 K/UL — SIGNIFICANT CHANGE UP (ref 150–400)
POTASSIUM SERPL-MCNC: 4.7 MMOL/L — SIGNIFICANT CHANGE UP (ref 3.5–5.3)
POTASSIUM SERPL-SCNC: 4.7 MMOL/L — SIGNIFICANT CHANGE UP (ref 3.5–5.3)
PROT SERPL-MCNC: 8.9 G/DL — HIGH (ref 6–8.3)
PROTHROM AB SERPL-ACNC: 16.3 SEC — HIGH (ref 10.6–13.6)
RBC # BLD: 4.55 M/UL — SIGNIFICANT CHANGE UP (ref 4.2–5.8)
RBC # FLD: 14.2 % — SIGNIFICANT CHANGE UP (ref 10.3–14.5)
SODIUM SERPL-SCNC: 141 MMOL/L — SIGNIFICANT CHANGE UP (ref 135–145)
TROPONIN T, HIGH SENSITIVITY RESULT: 50 NG/L — SIGNIFICANT CHANGE UP
WBC # BLD: 6.83 K/UL — SIGNIFICANT CHANGE UP (ref 3.8–10.5)
WBC # FLD AUTO: 6.83 K/UL — SIGNIFICANT CHANGE UP (ref 3.8–10.5)

## 2021-08-03 PROCEDURE — 99223 1ST HOSP IP/OBS HIGH 75: CPT

## 2021-08-03 PROCEDURE — 99285 EMERGENCY DEPT VISIT HI MDM: CPT

## 2021-08-03 PROCEDURE — 71045 X-RAY EXAM CHEST 1 VIEW: CPT | Mod: 26

## 2021-08-03 RX ORDER — METOPROLOL TARTRATE 50 MG
1 TABLET ORAL
Qty: 0 | Refills: 0 | DISCHARGE

## 2021-08-03 RX ORDER — INSULIN LISPRO 100/ML
VIAL (ML) SUBCUTANEOUS
Refills: 0 | Status: DISCONTINUED | OUTPATIENT
Start: 2021-08-03 | End: 2021-08-04

## 2021-08-03 RX ORDER — GLUCAGON INJECTION, SOLUTION 0.5 MG/.1ML
1 INJECTION, SOLUTION SUBCUTANEOUS ONCE
Refills: 0 | Status: DISCONTINUED | OUTPATIENT
Start: 2021-08-03 | End: 2021-08-04

## 2021-08-03 RX ORDER — ASPIRIN/CALCIUM CARB/MAGNESIUM 324 MG
81 TABLET ORAL DAILY
Refills: 0 | Status: DISCONTINUED | OUTPATIENT
Start: 2021-08-03 | End: 2021-08-11

## 2021-08-03 RX ORDER — METFORMIN HYDROCHLORIDE 850 MG/1
1 TABLET ORAL
Qty: 0 | Refills: 0 | DISCHARGE

## 2021-08-03 RX ORDER — PANTOPRAZOLE SODIUM 20 MG/1
40 TABLET, DELAYED RELEASE ORAL DAILY
Refills: 0 | Status: DISCONTINUED | OUTPATIENT
Start: 2021-08-03 | End: 2021-08-05

## 2021-08-03 RX ORDER — ATORVASTATIN CALCIUM 80 MG/1
1 TABLET, FILM COATED ORAL
Qty: 0 | Refills: 0 | DISCHARGE

## 2021-08-03 RX ORDER — ASPIRIN/CALCIUM CARB/MAGNESIUM 324 MG
1 TABLET ORAL
Qty: 0 | Refills: 0 | DISCHARGE

## 2021-08-03 RX ORDER — TIMOLOL 0.5 %
1 DROPS OPHTHALMIC (EYE) EVERY 12 HOURS
Refills: 0 | Status: DISCONTINUED | OUTPATIENT
Start: 2021-08-03 | End: 2021-08-12

## 2021-08-03 RX ORDER — FUROSEMIDE 40 MG
40 TABLET ORAL
Refills: 0 | Status: DISCONTINUED | OUTPATIENT
Start: 2021-08-03 | End: 2021-08-05

## 2021-08-03 RX ORDER — CLOPIDOGREL BISULFATE 75 MG/1
75 TABLET, FILM COATED ORAL DAILY
Refills: 0 | Status: DISCONTINUED | OUTPATIENT
Start: 2021-08-03 | End: 2021-08-06

## 2021-08-03 RX ORDER — DEXTROSE 50 % IN WATER 50 %
25 SYRINGE (ML) INTRAVENOUS ONCE
Refills: 0 | Status: DISCONTINUED | OUTPATIENT
Start: 2021-08-03 | End: 2021-08-04

## 2021-08-03 RX ORDER — DEXTROSE 50 % IN WATER 50 %
12.5 SYRINGE (ML) INTRAVENOUS ONCE
Refills: 0 | Status: DISCONTINUED | OUTPATIENT
Start: 2021-08-03 | End: 2021-08-04

## 2021-08-03 RX ORDER — LOSARTAN POTASSIUM 100 MG/1
100 TABLET, FILM COATED ORAL DAILY
Refills: 0 | Status: DISCONTINUED | OUTPATIENT
Start: 2021-08-03 | End: 2021-08-05

## 2021-08-03 RX ORDER — SODIUM CHLORIDE 9 MG/ML
1000 INJECTION, SOLUTION INTRAVENOUS
Refills: 0 | Status: DISCONTINUED | OUTPATIENT
Start: 2021-08-03 | End: 2021-08-04

## 2021-08-03 RX ORDER — TIMOLOL 0.5 %
1 DROPS OPHTHALMIC (EYE)
Qty: 0 | Refills: 0 | DISCHARGE

## 2021-08-03 RX ORDER — ATORVASTATIN CALCIUM 80 MG/1
40 TABLET, FILM COATED ORAL AT BEDTIME
Refills: 0 | Status: DISCONTINUED | OUTPATIENT
Start: 2021-08-03 | End: 2021-08-12

## 2021-08-03 RX ORDER — DEXTROSE 50 % IN WATER 50 %
15 SYRINGE (ML) INTRAVENOUS ONCE
Refills: 0 | Status: DISCONTINUED | OUTPATIENT
Start: 2021-08-03 | End: 2021-08-04

## 2021-08-03 RX ADMIN — Medication 40 MILLIGRAM(S): at 21:05

## 2021-08-03 RX ADMIN — ATORVASTATIN CALCIUM 40 MILLIGRAM(S): 80 TABLET, FILM COATED ORAL at 22:05

## 2021-08-03 NOTE — H&P ADULT - HISTORY OF PRESENT ILLNESS
Patient is a 71 year old hx of HTN, pre-DM2, porcine aortic valve, pafib, CAD s/p 4 stents last one 1 year prior, history GI ulcer, prostate cancer s/p prostatectomy and lymph node removal who presents from Dr. Velázquez's office due to sob for 2 weeks in duration. He has had increased abdominal swelling, LE swelling, and dyspnea on exertion. He denies chest pain, palpitations, or fevers. He took extra doses of his lasix and had improvement in his symptoms. He is unsure of any of his medications list. Denies h/a, nausea, vomiting, diarrhea, melena, hematemesis, visual changes or LOC.    ED vitals: HR 82, 98.7 F, /91, RR 26-30 98% on room air  EKG without acute findings  bnp 3.4k

## 2021-08-03 NOTE — ED PROVIDER NOTE - PHYSICAL EXAMINATION
CONSTITUTIONAL: non-toxic, well appearing  SKIN: no rash, no petechiae.  EYES: pink conjunctiva, anicteric  NECK: Supple; no meningismus, no JVD  CARD: RRR, no murmurs, equal radial pulses bilaterally 2+  RESP: bibasilar crackles, no respiratory distress  ABD: Soft, non-tender, mildly distended, no peritoneal signs  EXT: Normal ROM x4. 2+ bilateral lower extremity edema. No calf tenderness  NEURO: Alert, oriented. Neuro exam nonfocal.  PSYCH: Cooperative, appropriate.

## 2021-08-03 NOTE — ED PROVIDER NOTE - NS ED ROS FT
Review of Systems    Constitutional: (-) fever, (-) chills, (-) fatigue  Cardiovascular: (-) chest pain, (-) syncope  Respiratory: (-) cough, (-) shortness of breath  Gastrointestinal: (-) vomiting, (-) diarrhea, (-) abdominal pain  Musculoskeletal: (-) neck pain, (-) back pain, (-) joint pain  Integumentary: (-) rash, (-) edema, (-) wound  Neurological: (-) headache, (-) altered mental status    Except as documented in the HPI, all other systems are negative. Review of Systems    Constitutional: (-) fever, (-) chills, (-) fatigue  Cardiovascular: (-) chest pain, (-) syncope  Respiratory: (-) cough, (+) shortness of breath  Gastrointestinal: (-) vomiting, (-) diarrhea, (-) abdominal pain  Musculoskeletal: (-) neck pain, (-) back pain, (-) joint pain  Integumentary: (-) rash, (+) edema, (-) wound  Neurological: (-) headache, (-) altered mental status    Except as documented in the HPI, all other systems are negative.

## 2021-08-03 NOTE — ED ADULT NURSE REASSESSMENT NOTE - NS ED NURSE REASSESS COMMENT FT1
Report received from FABIAN Fraga. Patient A&Ox4, respirations even and unlabored. Patient denies any complaints at this time. On cardiac monitor-NSR. Stretcher in lowest position, wheels locked, appropriate side rails in place, call bell in reach.

## 2021-08-03 NOTE — H&P ADULT - PROBLEM SELECTOR PLAN 6
F-fluid restriction 1.2 L daily  E-replete K<4 and Mag <2  N-DASH/TLC diet  heparin drip, clarify w/ pts cardiologist outpatient

## 2021-08-03 NOTE — ED PROVIDER NOTE - CLINICAL SUMMARY MEDICAL DECISION MAKING FREE TEXT BOX
Matt-PGY3: 71 year old male with PMH HTN, HFrEF, porcine aortic valve, paroxysmal atrial fibrillation presents with shortness of breath on exertion x 2 weeks. Pt reports worsening shortness of breath with ambulation and when supine. Pt reports taking PO diuretics with little improvement. Pt reports increasing bilateral lower extremity edema. Denies any fevers, chest pain, abdominal pain, vomiting, diarrhea, bloody stools, black tarry stools, dysuria, headache, vision change, numbness, weakness, or rash. Pt seen by cardiologist Dr. Velázquez today and sent to ED for admission. Spoke to Dr. Velázquez, agreeable with admission at this time with BID lasix for diuresis. Will obtain labs, imaging, admit for further evaluation and management.

## 2021-08-03 NOTE — H&P ADULT - NSHPLABSRESULTS_GEN_ALL_CORE
LABS:                         13.8   6.83  )-----------( 221      ( 03 Aug 2021 20:58 )             43.7     08-03    141  |  102  |  18  ----------------------------<  86  4.7   |  24  |  1.00    Ca    9.9      03 Aug 2021 20:58    TPro  8.9<H>  /  Alb  4.1  /  TBili  1.2  /  DBili  x   /  AST  26  /  ALT  11  /  AlkPhos  129<H>  08-03

## 2021-08-03 NOTE — ED PROVIDER NOTE - ATTENDING CONTRIBUTION TO CARE
71M h/o HTN, HFrEF, aortic valve replacement, pAFib p/w worsening PHELPS and swelling x2 wks. Also worsening abd distension Had been taking PO diuretics with little improvement, despite increasing the dose. Denies fevers, cp, abd pain, n/v/d. Seen by cardiologist (Dr. Velázquez) today and sent to ED for admission. Denies any additional complaints.  Gen: nad  CV: rrr, no murmur  Pulm: bibasilar crackles  Abd: soft, distended, nt  Ext: 2+ b/l LE to above knee  MDM: 71M h/o HTN, HFrEF, aortic valve replacement, pAFib p/w worsening PHELPS and swelling x2 wks c/w acute on chronic HFrEF - d/w cardiologist, will diuresis, check labs, trop, ekg, cxr. TBA

## 2021-08-03 NOTE — H&P ADULT - PROBLEM SELECTOR PLAN 2
-afib called pharmacy not on BB or AC filled, not filled recently at [facility]  -pt is a poor historian will need collateral in am, sent to pharmacy  -heparin drip now, is on aspirin/plavix will start protonix given GI history

## 2021-08-03 NOTE — H&P ADULT - ASSESSMENT
Patient is a 71 year old hx of HTN, pre-DM2, porcine aortic valve, pafib, prostate cancer s/p prostatectomy and lymph node removal who presents from Dr. Velázquez's office due to sob for 2 weeks in duration.

## 2021-08-03 NOTE — ED ADULT TRIAGE NOTE - CHIEF COMPLAINT QUOTE
Pt c/o increasing SOB, B/L LE edema   x few weeks sent by PCP.  Denies chest pain, nausea , dizziness

## 2021-08-03 NOTE — ED PROVIDER NOTE - OBJECTIVE STATEMENT
71 year old male with PMH HTN, HFrEF, porcine aortic valve, paroxysmal atrial fibrillation presents with shortness of breath on exertion x 2 weeks. Pt reports worsening shortness of breath with ambulation and    TO BE COMPLETED 71 year old male with PMH HTN, HFrEF, porcine aortic valve, paroxysmal atrial fibrillation presents with shortness of breath on exertion x 2 weeks. Pt reports worsening shortness of breath with ambulation and when supine. Pt reports taking PO diuretics with little improvement. Pt reports increasing bilateral lower extremity edema. Denies any fevers, chest pain, abdominal pain, vomiting, diarrhea, bloody stools, black tarry stools, dysuria, headache, vision change, numbness, weakness, or rash. Pt seen by cardiologist Dr. Velázquez today and sent to ED for admission. Denies any additional complaints.

## 2021-08-03 NOTE — H&P ADULT - PROBLEM SELECTOR PLAN 1
-patient with diastolic CHF, unsure of his baseline EF  -is on lasix 40 mg at home, can give 40 iv BID  -strict ins and outs  -fluid restriction    #CAD s/p 4 stents  -last stent 1 year ago  -c/w asp/plavix started lipitor

## 2021-08-03 NOTE — ED ADULT NURSE NOTE - NSIMPLEMENTINTERV_GEN_ALL_ED
Implemented All Fall Risk Interventions:  Becker to call system. Call bell, personal items and telephone within reach. Instruct patient to call for assistance. Room bathroom lighting operational. Non-slip footwear when patient is off stretcher. Physically safe environment: no spills, clutter or unnecessary equipment. Stretcher in lowest position, wheels locked, appropriate side rails in place. Provide visual cue, wrist band, yellow gown, etc. Monitor gait and stability. Monitor for mental status changes and reorient to person, place, and time. Review medications for side effects contributing to fall risk. Reinforce activity limits and safety measures with patient and family.

## 2021-08-03 NOTE — H&P ADULT - NSHPPHYSICALEXAM_GEN_ALL_CORE
PHYSICAL EXAM:  GENERAL: NAD, well-developed  HEAD:  Atraumatic, Normocephalic  EYES: EOMI, PERRLA, conjunctiva and sclera clear  NECK: Supple, No JVD  CHEST/LUNG: Clear to auscultation bilaterally; No wheeze  HEART: Regular rate and rhythm; No murmurs, rubs, or gallops.   ABDOMEN: distended, moderate ascites present on bedside US  EXTREMITIES:  2+ Peripheral Pulses, No clubbing, cyanosis,. 2+ edema bilateral lower legs  PSYCH: AAOx3  NEUROLOGY: non-focal  SKIN: No rashes or lesions

## 2021-08-03 NOTE — ED ADULT NURSE NOTE - OBJECTIVE STATEMENT
Pt A+OX4 c/o SOB and pedal edema x2 weeks.  Thinks his meds have stopped working.  Saw MD today and was sent to the ER for admission for CHF.  Ambulates with a cane and a walker at home.  Says he has been having multiple mechanical falls.

## 2021-08-04 LAB
A1C WITH ESTIMATED AVERAGE GLUCOSE RESULT: 5.4 % — SIGNIFICANT CHANGE UP (ref 4–5.6)
ALBUMIN SERPL ELPH-MCNC: 3.5 G/DL — SIGNIFICANT CHANGE UP (ref 3.3–5)
ALP SERPL-CCNC: 116 U/L — SIGNIFICANT CHANGE UP (ref 40–120)
ALT FLD-CCNC: 10 U/L — SIGNIFICANT CHANGE UP (ref 4–41)
ANION GAP SERPL CALC-SCNC: 17 MMOL/L — HIGH (ref 7–14)
APTT BLD: 67.1 SEC — HIGH (ref 27–36.3)
AST SERPL-CCNC: 19 U/L — SIGNIFICANT CHANGE UP (ref 4–40)
BILIRUB SERPL-MCNC: 1.1 MG/DL — SIGNIFICANT CHANGE UP (ref 0.2–1.2)
BUN SERPL-MCNC: 17 MG/DL — SIGNIFICANT CHANGE UP (ref 7–23)
CALCIUM SERPL-MCNC: 9.2 MG/DL — SIGNIFICANT CHANGE UP (ref 8.4–10.5)
CHLORIDE SERPL-SCNC: 104 MMOL/L — SIGNIFICANT CHANGE UP (ref 98–107)
CHOLEST SERPL-MCNC: 110 MG/DL — SIGNIFICANT CHANGE UP
CO2 SERPL-SCNC: 19 MMOL/L — LOW (ref 22–31)
COVID-19 SPIKE DOMAIN AB INTERP: POSITIVE
COVID-19 SPIKE DOMAIN ANTIBODY RESULT: >250 U/ML — HIGH
CREAT SERPL-MCNC: 1.01 MG/DL — SIGNIFICANT CHANGE UP (ref 0.5–1.3)
ESTIMATED AVERAGE GLUCOSE: 108 — SIGNIFICANT CHANGE UP
GLUCOSE SERPL-MCNC: 102 MG/DL — HIGH (ref 70–99)
HCT VFR BLD CALC: 39.6 % — SIGNIFICANT CHANGE UP (ref 39–50)
HCV AB S/CO SERPL IA: 0.14 S/CO — SIGNIFICANT CHANGE UP (ref 0–0.99)
HCV AB SERPL-IMP: SIGNIFICANT CHANGE UP
HDLC SERPL-MCNC: 32 MG/DL — LOW
HGB BLD-MCNC: 12.5 G/DL — LOW (ref 13–17)
LIPID PNL WITH DIRECT LDL SERPL: 70 MG/DL — SIGNIFICANT CHANGE UP
MAGNESIUM SERPL-MCNC: 2.2 MG/DL — SIGNIFICANT CHANGE UP (ref 1.6–2.6)
MCHC RBC-ENTMCNC: 30.2 PG — SIGNIFICANT CHANGE UP (ref 27–34)
MCHC RBC-ENTMCNC: 31.6 GM/DL — LOW (ref 32–36)
MCV RBC AUTO: 95.7 FL — SIGNIFICANT CHANGE UP (ref 80–100)
NON HDL CHOLESTEROL: 78 MG/DL — SIGNIFICANT CHANGE UP
NRBC # BLD: 0 /100 WBCS — SIGNIFICANT CHANGE UP
NRBC # FLD: 0 K/UL — SIGNIFICANT CHANGE UP
PLATELET # BLD AUTO: 194 K/UL — SIGNIFICANT CHANGE UP (ref 150–400)
POTASSIUM SERPL-MCNC: 4.2 MMOL/L — SIGNIFICANT CHANGE UP (ref 3.5–5.3)
POTASSIUM SERPL-SCNC: 4.2 MMOL/L — SIGNIFICANT CHANGE UP (ref 3.5–5.3)
PROT SERPL-MCNC: 7.6 G/DL — SIGNIFICANT CHANGE UP (ref 6–8.3)
RBC # BLD: 4.14 M/UL — LOW (ref 4.2–5.8)
RBC # FLD: 14.1 % — SIGNIFICANT CHANGE UP (ref 10.3–14.5)
SARS-COV-2 IGG+IGM SERPL QL IA: >250 U/ML — HIGH
SARS-COV-2 IGG+IGM SERPL QL IA: POSITIVE
SARS-COV-2 RNA SPEC QL NAA+PROBE: SIGNIFICANT CHANGE UP
SODIUM SERPL-SCNC: 140 MMOL/L — SIGNIFICANT CHANGE UP (ref 135–145)
TRIGL SERPL-MCNC: 42 MG/DL — SIGNIFICANT CHANGE UP
TROPONIN T, HIGH SENSITIVITY RESULT: 48 NG/L — SIGNIFICANT CHANGE UP
WBC # BLD: 6.28 K/UL — SIGNIFICANT CHANGE UP (ref 3.8–10.5)
WBC # FLD AUTO: 6.28 K/UL — SIGNIFICANT CHANGE UP (ref 3.8–10.5)

## 2021-08-04 RX ORDER — HEPARIN SODIUM 5000 [USP'U]/ML
6000 INJECTION INTRAVENOUS; SUBCUTANEOUS EVERY 6 HOURS
Refills: 0 | Status: DISCONTINUED | OUTPATIENT
Start: 2021-08-04 | End: 2021-08-07

## 2021-08-04 RX ORDER — DEXTROSE 50 % IN WATER 50 %
15 SYRINGE (ML) INTRAVENOUS ONCE
Refills: 0 | Status: DISCONTINUED | OUTPATIENT
Start: 2021-08-04 | End: 2021-08-12

## 2021-08-04 RX ORDER — INSULIN LISPRO 100/ML
VIAL (ML) SUBCUTANEOUS
Refills: 0 | Status: DISCONTINUED | OUTPATIENT
Start: 2021-08-04 | End: 2021-08-10

## 2021-08-04 RX ORDER — HEPARIN SODIUM 5000 [USP'U]/ML
3000 INJECTION INTRAVENOUS; SUBCUTANEOUS EVERY 6 HOURS
Refills: 0 | Status: DISCONTINUED | OUTPATIENT
Start: 2021-08-04 | End: 2021-08-07

## 2021-08-04 RX ORDER — ACETAMINOPHEN 500 MG
650 TABLET ORAL EVERY 6 HOURS
Refills: 0 | Status: DISCONTINUED | OUTPATIENT
Start: 2021-08-04 | End: 2021-08-12

## 2021-08-04 RX ORDER — DEXTROSE 50 % IN WATER 50 %
25 SYRINGE (ML) INTRAVENOUS ONCE
Refills: 0 | Status: DISCONTINUED | OUTPATIENT
Start: 2021-08-04 | End: 2021-08-12

## 2021-08-04 RX ORDER — GLUCAGON INJECTION, SOLUTION 0.5 MG/.1ML
1 INJECTION, SOLUTION SUBCUTANEOUS ONCE
Refills: 0 | Status: DISCONTINUED | OUTPATIENT
Start: 2021-08-04 | End: 2021-08-10

## 2021-08-04 RX ORDER — DEXTROSE 50 % IN WATER 50 %
12.5 SYRINGE (ML) INTRAVENOUS ONCE
Refills: 0 | Status: DISCONTINUED | OUTPATIENT
Start: 2021-08-04 | End: 2021-08-12

## 2021-08-04 RX ORDER — SODIUM CHLORIDE 9 MG/ML
1000 INJECTION, SOLUTION INTRAVENOUS
Refills: 0 | Status: DISCONTINUED | OUTPATIENT
Start: 2021-08-04 | End: 2021-08-10

## 2021-08-04 RX ORDER — INSULIN LISPRO 100/ML
VIAL (ML) SUBCUTANEOUS AT BEDTIME
Refills: 0 | Status: DISCONTINUED | OUTPATIENT
Start: 2021-08-04 | End: 2021-08-10

## 2021-08-04 RX ORDER — HEPARIN SODIUM 5000 [USP'U]/ML
1100 INJECTION INTRAVENOUS; SUBCUTANEOUS
Qty: 25000 | Refills: 0 | Status: DISCONTINUED | OUTPATIENT
Start: 2021-08-04 | End: 2021-08-04

## 2021-08-04 RX ORDER — HEPARIN SODIUM 5000 [USP'U]/ML
1100 INJECTION INTRAVENOUS; SUBCUTANEOUS
Qty: 25000 | Refills: 0 | Status: DISCONTINUED | OUTPATIENT
Start: 2021-08-04 | End: 2021-08-07

## 2021-08-04 RX ADMIN — Medication 1 DROP(S): at 06:06

## 2021-08-04 RX ADMIN — Medication 40 MILLIGRAM(S): at 05:14

## 2021-08-04 RX ADMIN — HEPARIN SODIUM 11 UNIT(S)/HR: 5000 INJECTION INTRAVENOUS; SUBCUTANEOUS at 16:20

## 2021-08-04 RX ADMIN — Medication 81 MILLIGRAM(S): at 17:49

## 2021-08-04 RX ADMIN — HEPARIN SODIUM 11 UNIT(S)/HR: 5000 INJECTION INTRAVENOUS; SUBCUTANEOUS at 05:14

## 2021-08-04 RX ADMIN — CLOPIDOGREL BISULFATE 75 MILLIGRAM(S): 75 TABLET, FILM COATED ORAL at 17:49

## 2021-08-04 RX ADMIN — HEPARIN SODIUM 1100 UNIT(S)/HR: 5000 INJECTION INTRAVENOUS; SUBCUTANEOUS at 16:28

## 2021-08-04 RX ADMIN — LOSARTAN POTASSIUM 100 MILLIGRAM(S): 100 TABLET, FILM COATED ORAL at 05:13

## 2021-08-04 RX ADMIN — Medication 40 MILLIGRAM(S): at 17:49

## 2021-08-04 RX ADMIN — Medication 1 DROP(S): at 19:38

## 2021-08-04 RX ADMIN — ATORVASTATIN CALCIUM 40 MILLIGRAM(S): 80 TABLET, FILM COATED ORAL at 21:49

## 2021-08-04 NOTE — CONSULT NOTE ADULT - ASSESSMENT
Cath 6/15/2020: s/p successful ROCIO placement to the proximal RCA and RPDA.  Cath 6/16/2020: s/p successful staged PCI to the mid LAD. Medical management of LCX disease.  Echo 6/13/2020: mild Mr, fx Bioprosthetic aortic valve replacement. EF 15-20%, Moderate concentric LVH. Severe global left ventricular systolic dysfunction. mild pulm htn    a/p  71 year old with  Hypertension, Coronary Artery Disease, S/P PCI ( s/p ROCIO placement to the proximal RCA and RPDA 6/15/2020, PCI to LAD 6/16/2020), Systolic Congestive Heart Failure, Aortic Stenosis, S/P bioprosthetic AVR , Paroxysmal Atrial Fibrillation, Prostate CA presenting with fluid overload, SOB    # Acute on Chronic Systolic Congestive Heart Failure  -remains overloaded, but improving  -c/w lasix 40 mg IVP BID  -check echo  -strict i/o   -Continue beta blocker and ARB  -hs trop neg- no acs     # Hypertension  -Continue medications as prescribed.- bp much improved     #Coronary Artery Disease, S/P PCI.  -Recent Cath with Lcx disease and is s/p PCI to prox RCA, RPDA and LAD.  -Continue medical management for Lcx disease  -Stable without angina  -Continue ASA 81mg daily and plavix daily  -no acs     #. Aortic Stenosis s/p BioAVR  -Recent echo with functioning bioprosthetic AVR.    # Paroxysmal Atrial Fibrillation  -Stable and in NSR  -Continue beta blocker as ordered  -ChadsVac= 4,  RESUME xarelto    Cath 6/15/2020: s/p successful ROCIO placement to the proximal RCA and RPDA.  Cath 6/16/2020: s/p successful staged PCI to the mid LAD. Medical management of LCX disease.  Echo 6/13/2020: mild Mr, fx Bioprosthetic aortic valve replacement. EF 15-20%, Moderate concentric LVH. Severe global left ventricular systolic dysfunction. mild pulm htn    a/p  71 year old with  Hypertension, Coronary Artery Disease, S/P PCI ( s/p ROCIO placement to the proximal RCA and RPDA 6/15/2020, PCI to LAD 6/16/2020), Systolic Congestive Heart Failure, Aortic Stenosis, S/P bioprosthetic AVR , Paroxysmal Atrial Fibrillation, Prostate CA presenting with fluid overload, SOB    # Acute on Chronic Systolic Congestive Heart Failure  -remains overloaded, but improving  -c/w lasix 40 mg IVP BID  -check echo  -strict i/o   -Continue beta blocker and ARB  -hs trop neg- no acs   -check LE dopplers    # Hypertension  -Continue medications as prescribed.- bp much improved     #Coronary Artery Disease, S/P PCI.  -Recent Cath with Lcx disease and is s/p PCI to prox RCA, RPDA and LAD.  -Continue medical management for Lcx disease  -Stable without angina  -Continue ASA 81mg daily and plavix daily  -no acs     #. Aortic Stenosis s/p BioAVR  -Recent echo with functioning bioprosthetic AVR.    # Paroxysmal Atrial Fibrillation  -Stable and in NSR  -Continue beta blocker as ordered  -ChadsVac= 4,  RESUME xarelto

## 2021-08-04 NOTE — PHARMACOTHERAPY INTERVENTION NOTE - COMMENTS
Medication history is incomplete. Unable to verify patient's medication list with two sources. Please call spectra [phone] if you have any questions.  awaiting call back from emergency contact

## 2021-08-04 NOTE — PHYSICAL THERAPY INITIAL EVALUATION ADULT - GAIT DEVIATIONS NOTED, PT EVAL
decreased louisa/increased time in double stance/decreased velocity of limb motion/decreased weight-shifting ability

## 2021-08-04 NOTE — CONSULT NOTE ADULT - TIME BILLING
Patient seen and examined, agree with the above assessment and plan by KHURRAM Canela.  71 year old male known to our practice, PMH of  Hypertension, Coronary Artery Disease, S/P PCI ( s/p ROCIO placement to the proximal RCA and RPDA 6/15/2020, PCI to LAD 6/16/2020), Systolic Congestive Heart Failure, Aortic Stenosis, S/P bioprosthetic AVR , Paroxysmal Atrial Fibrillation, Prostate CA presenting with fluid overload, SOB  remains overloaded, but improving  c/w lasix 40 mg IVP BID  check echo for ICD candidacy  strict i/o   Continue beta blocker and ARB  hs trop neg- no acs   check LE dopplers  cont xarelto Patient seen and examined, agree with the above assessment and plan by KHURRAM Canela.  71 year old male known to our practice, PMH of  Hypertension, Coronary Artery Disease, S/P PCI ( s/p ROCIO placement to the proximal RCA and RPDA 6/15/2020, PCI to LAD 6/16/2020), Systolic Congestive Heart Failure, Aortic Stenosis, S/P bioprosthetic AVR , Paroxysmal Atrial Fibrillation, Prostate CA presenting with fluid overload, SOB  remains overloaded, but improving  c/w lasix 40 mg IVP BID  check echo for ICD candidacy  strict i/o   Continue beta blocker and ARB  hs trop neg- no acs   check LE dopplers  cont xarelto  ACP - Advanced Care Planning    -Advanced care planning discussed with the patient. Advanced care planning forms discussed with the patient and/or family.  Risks, benefits, and alternatives of medical/cardiac procedures were discussed in detail with all questions answered. Up to 30 minutes were spent addressing advanced care planning.

## 2021-08-05 LAB
ANION GAP SERPL CALC-SCNC: 10 MMOL/L — SIGNIFICANT CHANGE UP (ref 7–14)
ANION GAP SERPL CALC-SCNC: 15 MMOL/L — HIGH (ref 7–14)
APTT BLD: 105.1 SEC — SIGNIFICANT CHANGE UP (ref 27–36.3)
APTT BLD: 53.6 SEC — HIGH (ref 27–36.3)
APTT BLD: 72.8 SEC — HIGH (ref 27–36.3)
BASOPHILS # BLD AUTO: 0.06 K/UL — SIGNIFICANT CHANGE UP (ref 0–0.2)
BASOPHILS NFR BLD AUTO: 1 % — SIGNIFICANT CHANGE UP (ref 0–2)
BUN SERPL-MCNC: 19 MG/DL — SIGNIFICANT CHANGE UP (ref 7–23)
BUN SERPL-MCNC: 20 MG/DL — SIGNIFICANT CHANGE UP (ref 7–23)
CALCIUM SERPL-MCNC: 7 MG/DL — LOW (ref 8.4–10.5)
CALCIUM SERPL-MCNC: 9.7 MG/DL — SIGNIFICANT CHANGE UP (ref 8.4–10.5)
CHLORIDE SERPL-SCNC: 108 MMOL/L — HIGH (ref 98–107)
CHLORIDE SERPL-SCNC: 99 MMOL/L — SIGNIFICANT CHANGE UP (ref 98–107)
CO2 SERPL-SCNC: 22 MMOL/L — SIGNIFICANT CHANGE UP (ref 22–31)
CO2 SERPL-SCNC: 23 MMOL/L — SIGNIFICANT CHANGE UP (ref 22–31)
CREAT SERPL-MCNC: 0.92 MG/DL — SIGNIFICANT CHANGE UP (ref 0.5–1.3)
CREAT SERPL-MCNC: 1.08 MG/DL — SIGNIFICANT CHANGE UP (ref 0.5–1.3)
EOSINOPHIL # BLD AUTO: 0.38 K/UL — SIGNIFICANT CHANGE UP (ref 0–0.5)
EOSINOPHIL NFR BLD AUTO: 6.6 % — HIGH (ref 0–6)
GLUCOSE SERPL-MCNC: 84 MG/DL — SIGNIFICANT CHANGE UP (ref 70–99)
GLUCOSE SERPL-MCNC: 88 MG/DL — SIGNIFICANT CHANGE UP (ref 70–99)
HCT VFR BLD CALC: 38.7 % — LOW (ref 39–50)
HCT VFR BLD CALC: 41.8 % — SIGNIFICANT CHANGE UP (ref 39–50)
HGB BLD-MCNC: 12.5 G/DL — LOW (ref 13–17)
HGB BLD-MCNC: 13.5 G/DL — SIGNIFICANT CHANGE UP (ref 13–17)
IANC: 4.43 K/UL — SIGNIFICANT CHANGE UP (ref 1.5–8.5)
IMM GRANULOCYTES NFR BLD AUTO: 0.5 % — SIGNIFICANT CHANGE UP (ref 0–1.5)
LYMPHOCYTES # BLD AUTO: 0.48 K/UL — LOW (ref 1–3.3)
LYMPHOCYTES # BLD AUTO: 8.4 % — LOW (ref 13–44)
MAGNESIUM SERPL-MCNC: 1.6 MG/DL — SIGNIFICANT CHANGE UP (ref 1.6–2.6)
MCHC RBC-ENTMCNC: 30.2 PG — SIGNIFICANT CHANGE UP (ref 27–34)
MCHC RBC-ENTMCNC: 30.5 PG — SIGNIFICANT CHANGE UP (ref 27–34)
MCHC RBC-ENTMCNC: 32.3 GM/DL — SIGNIFICANT CHANGE UP (ref 32–36)
MCHC RBC-ENTMCNC: 32.3 GM/DL — SIGNIFICANT CHANGE UP (ref 32–36)
MCV RBC AUTO: 93.5 FL — SIGNIFICANT CHANGE UP (ref 80–100)
MCV RBC AUTO: 94.4 FL — SIGNIFICANT CHANGE UP (ref 80–100)
MONOCYTES # BLD AUTO: 0.35 K/UL — SIGNIFICANT CHANGE UP (ref 0–0.9)
MONOCYTES NFR BLD AUTO: 6.1 % — SIGNIFICANT CHANGE UP (ref 2–14)
NEUTROPHILS # BLD AUTO: 4.43 K/UL — SIGNIFICANT CHANGE UP (ref 1.8–7.4)
NEUTROPHILS NFR BLD AUTO: 77.4 % — HIGH (ref 43–77)
NRBC # BLD: 0 /100 WBCS — SIGNIFICANT CHANGE UP
NRBC # BLD: 0 /100 WBCS — SIGNIFICANT CHANGE UP
NRBC # FLD: 0 K/UL — SIGNIFICANT CHANGE UP
NRBC # FLD: 0 K/UL — SIGNIFICANT CHANGE UP
NT-PROBNP SERPL-SCNC: 4843 PG/ML — HIGH
PHOSPHATE SERPL-MCNC: 2.4 MG/DL — LOW (ref 2.5–4.5)
PLATELET # BLD AUTO: 195 K/UL — SIGNIFICANT CHANGE UP (ref 150–400)
PLATELET # BLD AUTO: 214 K/UL — SIGNIFICANT CHANGE UP (ref 150–400)
POTASSIUM SERPL-MCNC: 3.1 MMOL/L — LOW (ref 3.5–5.3)
POTASSIUM SERPL-MCNC: 3.9 MMOL/L — SIGNIFICANT CHANGE UP (ref 3.5–5.3)
POTASSIUM SERPL-SCNC: 3.1 MMOL/L — LOW (ref 3.5–5.3)
POTASSIUM SERPL-SCNC: 3.9 MMOL/L — SIGNIFICANT CHANGE UP (ref 3.5–5.3)
RBC # BLD: 4.14 M/UL — LOW (ref 4.2–5.8)
RBC # BLD: 4.43 M/UL — SIGNIFICANT CHANGE UP (ref 4.2–5.8)
RBC # FLD: 14 % — SIGNIFICANT CHANGE UP (ref 10.3–14.5)
RBC # FLD: 14 % — SIGNIFICANT CHANGE UP (ref 10.3–14.5)
SODIUM SERPL-SCNC: 137 MMOL/L — SIGNIFICANT CHANGE UP (ref 135–145)
SODIUM SERPL-SCNC: 140 MMOL/L — SIGNIFICANT CHANGE UP (ref 135–145)
WBC # BLD: 5.73 K/UL — SIGNIFICANT CHANGE UP (ref 3.8–10.5)
WBC # BLD: 6.09 K/UL — SIGNIFICANT CHANGE UP (ref 3.8–10.5)
WBC # FLD AUTO: 5.73 K/UL — SIGNIFICANT CHANGE UP (ref 3.8–10.5)
WBC # FLD AUTO: 6.09 K/UL — SIGNIFICANT CHANGE UP (ref 3.8–10.5)

## 2021-08-05 PROCEDURE — 93306 TTE W/DOPPLER COMPLETE: CPT | Mod: 26

## 2021-08-05 PROCEDURE — 93970 EXTREMITY STUDY: CPT | Mod: 26

## 2021-08-05 RX ORDER — PANTOPRAZOLE SODIUM 20 MG/1
40 TABLET, DELAYED RELEASE ORAL
Refills: 0 | Status: DISCONTINUED | OUTPATIENT
Start: 2021-08-05 | End: 2021-08-12

## 2021-08-05 RX ORDER — POTASSIUM CHLORIDE 20 MEQ
40 PACKET (EA) ORAL EVERY 4 HOURS
Refills: 0 | Status: COMPLETED | OUTPATIENT
Start: 2021-08-05 | End: 2021-08-06

## 2021-08-05 RX ORDER — POTASSIUM CHLORIDE 20 MEQ
10 PACKET (EA) ORAL
Refills: 0 | Status: COMPLETED | OUTPATIENT
Start: 2021-08-05 | End: 2021-08-06

## 2021-08-05 RX ORDER — FUROSEMIDE 40 MG
60 TABLET ORAL
Refills: 0 | Status: DISCONTINUED | OUTPATIENT
Start: 2021-08-05 | End: 2021-08-07

## 2021-08-05 RX ORDER — LOSARTAN POTASSIUM 100 MG/1
50 TABLET, FILM COATED ORAL DAILY
Refills: 0 | Status: DISCONTINUED | OUTPATIENT
Start: 2021-08-05 | End: 2021-08-10

## 2021-08-05 RX ORDER — METOPROLOL TARTRATE 50 MG
25 TABLET ORAL DAILY
Refills: 0 | Status: DISCONTINUED | OUTPATIENT
Start: 2021-08-05 | End: 2021-08-06

## 2021-08-05 RX ADMIN — Medication 60 MILLIGRAM(S): at 17:57

## 2021-08-05 RX ADMIN — HEPARIN SODIUM 3000 UNIT(S): 5000 INJECTION INTRAVENOUS; SUBCUTANEOUS at 23:00

## 2021-08-05 RX ADMIN — Medication 81 MILLIGRAM(S): at 17:56

## 2021-08-05 RX ADMIN — HEPARIN SODIUM 900 UNIT(S)/HR: 5000 INJECTION INTRAVENOUS; SUBCUTANEOUS at 10:20

## 2021-08-05 RX ADMIN — HEPARIN SODIUM 1100 UNIT(S)/HR: 5000 INJECTION INTRAVENOUS; SUBCUTANEOUS at 01:24

## 2021-08-05 RX ADMIN — Medication 1 DROP(S): at 17:57

## 2021-08-05 RX ADMIN — Medication 40 MILLIGRAM(S): at 05:45

## 2021-08-05 RX ADMIN — ATORVASTATIN CALCIUM 40 MILLIGRAM(S): 80 TABLET, FILM COATED ORAL at 22:54

## 2021-08-05 RX ADMIN — CLOPIDOGREL BISULFATE 75 MILLIGRAM(S): 75 TABLET, FILM COATED ORAL at 17:57

## 2021-08-05 RX ADMIN — LOSARTAN POTASSIUM 100 MILLIGRAM(S): 100 TABLET, FILM COATED ORAL at 05:44

## 2021-08-05 RX ADMIN — Medication 1 DROP(S): at 05:44

## 2021-08-05 RX ADMIN — HEPARIN SODIUM 1100 UNIT(S)/HR: 5000 INJECTION INTRAVENOUS; SUBCUTANEOUS at 22:54

## 2021-08-05 NOTE — PROGRESS NOTE ADULT - ASSESSMENT
Problem/Plan - 1:  ·  Problem: Acute on chronic diastolic congestive heart failure.  Plan: -patient with diastolic CHF, unsure of his baseline EF  -is on lasix 40 mg at home, can give 40 iv BID  -strict ins and outs  -fluid restriction    #CAD s/p 4 stents  -last stent 1 year ago  -c/w asp/plavix started lipitor.     Problem/Plan - 2:  ·  Problem: AF (Paroxysmal Atrial Fibrillation).  Plan: -afib called pharmacy not on BB or AC filled, not filled recently at [facility]  -pt is a poor historian will need collateral in am, sent to pharmacy  -heparin drip now, is on aspirin/plavix will start protonix given GI history.     Problem/Plan - 3:  ·  Problem: Other ascites.  Plan: -moderate ascites likely 2/2 CHF  -diuresis as above, hold off on paracentesis given AC.     Problem/Plan - 4:  ·  Problem: Essential hypertension.  Plan: -c/w losartan 100 mg.     Problem/Plan - 5:  ·  Problem: Chronic gastric ulcer, unspecified whether gastric ulcer hemorrhage or perforation present.  Plan: -no epigastric tenderness  -started on protonix daily.     Problem/Plan - 6:  Problem: Nutrition, metabolism, and development symptoms. Plan: F-fluid restriction 1.2 L daily  E-replete K<4 and Mag <2  N-DASH/TLC diet

## 2021-08-05 NOTE — PROGRESS NOTE ADULT - SUBJECTIVE AND OBJECTIVE BOX
Patient is a 71y old  Male who presents with a chief complaint of sob HF exacerbation (05 Aug 2021 12:15)    Date of servie : 08-05-21 @ 17:20  INTERVAL HPI/OVERNIGHT EVENTS:  T(C): 36.6 (08-05-21 @ 10:50), Max: 37 (08-04-21 @ 20:30)  HR: 79 (08-05-21 @ 10:50) (73 - 88)  BP: 107/88 (08-05-21 @ 15:23) (107/88 - 149/109)  RR: 19 (08-05-21 @ 15:23) (18 - 19)  SpO2: 100% (08-05-21 @ 15:23) (99% - 100%)  Wt(kg): --  I&O's Summary    04 Aug 2021 07:01  -  05 Aug 2021 07:00  --------------------------------------------------------  IN: 300 mL / OUT: 1400 mL / NET: -1100 mL    05 Aug 2021 07:01  -  05 Aug 2021 17:20  --------------------------------------------------------  IN: 0 mL / OUT: 400 mL / NET: -400 mL        LABS:                        13.5   5.73  )-----------( 214      ( 05 Aug 2021 07:59 )             41.8     08-05    137  |  99  |  20  ----------------------------<  88  3.9   |  23  |  1.08    Ca    9.7      05 Aug 2021 07:59  Mg     2.20     08-04    TPro  7.6  /  Alb  3.5  /  TBili  1.1  /  DBili  x   /  AST  19  /  ALT  10  /  AlkPhos  116  08-04    PT/INR - ( 03 Aug 2021 20:58 )   PT: 16.3 sec;   INR: 1.44 ratio         PTT - ( 05 Aug 2021 07:59 )  PTT:105.1 sec    CAPILLARY BLOOD GLUCOSE      POCT Blood Glucose.: 120 mg/dL (05 Aug 2021 12:14)  POCT Blood Glucose.: 83 mg/dL (05 Aug 2021 08:53)  POCT Blood Glucose.: 111 mg/dL (04 Aug 2021 21:42)            MEDICATIONS  (STANDING):  aspirin enteric coated 81 milliGRAM(s) Oral daily  atorvastatin 40 milliGRAM(s) Oral at bedtime  clopidogrel Tablet 75 milliGRAM(s) Oral daily  dextrose 40% Gel 15 Gram(s) Oral once  dextrose 5%. 1000 milliLiter(s) (50 mL/Hr) IV Continuous <Continuous>  dextrose 5%. 1000 milliLiter(s) (100 mL/Hr) IV Continuous <Continuous>  dextrose 50% Injectable 25 Gram(s) IV Push once  dextrose 50% Injectable 12.5 Gram(s) IV Push once  dextrose 50% Injectable 25 Gram(s) IV Push once  furosemide   Injectable 60 milliGRAM(s) IV Push two times a day  glucagon  Injectable 1 milliGRAM(s) IntraMuscular once  heparin  Infusion. 1100 Unit(s)/Hr (11 mL/Hr) IV Continuous <Continuous>  insulin lispro (ADMELOG) corrective regimen sliding scale   SubCutaneous three times a day before meals  insulin lispro (ADMELOG) corrective regimen sliding scale   SubCutaneous at bedtime  losartan 50 milliGRAM(s) Oral daily  metolazone 2.5 milliGRAM(s) Oral daily  metoprolol succinate ER 25 milliGRAM(s) Oral daily  pantoprazole    Tablet 40 milliGRAM(s) Oral before breakfast  timolol 0.5% Solution 1 Drop(s) Both EYES every 12 hours    MEDICATIONS  (PRN):  acetaminophen   Tablet .. 650 milliGRAM(s) Oral every 6 hours PRN Temp greater or equal to 38C (100.4F), Mild Pain (1 - 3), Moderate Pain (4 - 6)  heparin   Injectable 6000 Unit(s) IV Push every 6 hours PRN For aPTT less than 40  heparin   Injectable 3000 Unit(s) IV Push every 6 hours PRN For aPTT between 40 - 57          PHYSICAL EXAM:  GENERAL: NAD, well-groomed, well-developed  HEAD:  Atraumatic, Normocephalic  CHEST/LUNG: Clear to percussion bilaterally; No rales, rhonchi, wheezing, or rubs  HEART: Regular rate and rhythm; No murmurs, rubs, or gallops  ABDOMEN: Soft, Nontender, Nondistended; Bowel sounds present  EXTREMITIES:  2+ Peripheral Pulses, No clubbing, cyanosis, or edema  LYMPH: No lymphadenopathy noted  SKIN: No rashes or lesions    Care Discussed with Consultants/Other Providers [ ] YES  [ ] NO

## 2021-08-05 NOTE — PROGRESS NOTE ADULT - ASSESSMENT
Cath 6/15/2020: s/p successful ROCIO placement to the proximal RCA and RPDA.  Cath 6/16/2020: s/p successful staged PCI to the mid LAD. Medical management of LCX disease.  Echo 6/13/2020: mild Mr, fx Bioprosthetic aortic valve replacement. EF 15-20%, Moderate concentric LVH. Severe global left ventricular systolic dysfunction. mild pulm htn    a/p  71 year old with  Hypertension, Coronary Artery Disease, S/P PCI ( s/p ROCIO placement to the proximal RCA and RPDA 6/15/2020, PCI to LAD 6/16/2020), Systolic Congestive Heart Failure, Aortic Stenosis, S/P bioprosthetic AVR , Paroxysmal Atrial Fibrillation, Prostate CA presenting with fluid overload, SOB    # Acute on Chronic Systolic Congestive Heart Failure  -remains overloaded on exam   -increase lasix to 60mg IVP bid   -check echo  -strict i/o   -Continue beta blocker and ARB  -hs trop neg- no acs   -pending LE dopplers    # Hypertension  -Continue medications as prescribed.- bp much improved     #Coronary Artery Disease, S/P PCI.  -Recent Cath with Lcx disease and is s/p PCI to prox RCA, RPDA and LAD.  -Continue medical management for Lcx disease   -Stable without angina  -Continue ASA 81mg daily and plavix daily  -no acs     #. Aortic Stenosis s/p BioAVR  -Recent echo with functioning bioprosthetic AVR.    # Paroxysmal Atrial Fibrillation  -Stable and in NSR  -Continue beta blocker as ordered  -ChadsVac= 4, c/w hep gtt for now pending echo results.    Cath 6/15/2020: s/p successful ROCIO placement to the proximal RCA and RPDA.  Cath 6/16/2020: s/p successful staged PCI to the mid LAD. Medical management of LCX disease.  Echo 6/13/2020: mild Mr, fx Bioprosthetic aortic valve replacement. EF 15-20%, Moderate concentric LVH. Severe global left ventricular systolic dysfunction. mild pulm htn    a/p  71 year old with  Hypertension, Coronary Artery Disease, S/P PCI ( s/p ROCIO placement to the proximal RCA and RPDA 6/15/2020, PCI to LAD 6/16/2020), Systolic Congestive Heart Failure, Aortic Stenosis, S/P bioprosthetic AVR , Paroxysmal Atrial Fibrillation, Prostate CA presenting with fluid overload, SOB    # Acute on Chronic Systolic Congestive Heart Failure  -remains overloaded on exam   -increase lasix to 60mg IVP bid, add metol  -check echo  -strict i/o   -Continue beta blocker and ARB  -hs trop neg- no acs   -pending LE dopplers    # Hypertension  -Continue medications as prescribed.- bp much improved     #Coronary Artery Disease, S/P PCI.  -Recent Cath with Lcx disease and is s/p PCI to prox RCA, RPDA and LAD.  -Continue medical management for Lcx disease   -Stable without angina  -Continue ASA 81mg daily and plavix daily  -no acs     #. Aortic Stenosis s/p BioAVR  -Recent echo with functioning bioprosthetic AVR.    # Paroxysmal Atrial Fibrillation  -Stable and in NSR  -Continue beta blocker as ordered  -ChadsVac= 4, c/w hep gtt for now pending echo results.

## 2021-08-05 NOTE — PROGRESS NOTE ADULT - SUBJECTIVE AND OBJECTIVE BOX
CARDIOLOGY FOLLOW UP - Dr. Velázquez  DATE OF SERVICE: 08-05-21     CC no cp  still with SOB with exertion but improved      REVIEW OF SYSTEMS:   CONSTITUTIONAL: No fever, weight loss, or fatigue   RESPIRATORY:  No cough, wheezing, chills or hemoptysis; No SOB  CARDIOVASCULAR: No chest pain, palpitations, passing out, dizziness, or leg swelling   GASTROINTESTINAL: No abdominal or epigastric pain. No nausea, vomiting, or hematemesis, no diarreha, or constipation, No melena or hematochezia   VASCULAR: no edema.     PHYSICAL EXAM:  T(C): 36.6 (08-05-21 @ 10:50), Max: 37 (08-04-21 @ 20:30)  HR: 79 (08-05-21 @ 10:50) (72 - 88)  BP: 120/79 (08-05-21 @ 10:50) (120/79 - 149/109)  RR: 18 (08-05-21 @ 10:50) (18 - 20)  SpO2: 100% (08-05-21 @ 10:50) (99% - 100%)  Wt(kg): --  I&O's Summary    04 Aug 2021 07:01  -  05 Aug 2021 07:00  --------------------------------------------------------  IN: 300 mL / OUT: 1400 mL / NET: -1100 mL        Appearance: Normal   Cardiovascular: Normal S1 S2,RRR, No JVD, No murmurs  Respiratory: crackles at bases   Gastrointestinal:  abd distention   Extremities: Normal range of motion, No clubbing,b/l ++ le edema       HOME MEDICATIONS:  Aspirin Enteric Coated 81 mg oral delayed release tablet: 1 tab(s) orally once a day (03 Aug 2021 21:43)  Lasix 40 mg oral tablet: 1 tab(s) orally once a day (03 Aug 2021 21:43)  losartan 100 mg oral tablet: 1 tab(s) orally once a day (03 Aug 2021 21:43)  Plavix 75 mg oral tablet: 1 tab(s) orally once a day (03 Aug 2021 21:43)  timolol hemihydrate 0.5% ophthalmic solution: 1 drop(s) to each affected eye 2 times a day (03 Aug 2021 21:43)      MEDICATIONS  (STANDING):  aspirin enteric coated 81 milliGRAM(s) Oral daily  atorvastatin 40 milliGRAM(s) Oral at bedtime  clopidogrel Tablet 75 milliGRAM(s) Oral daily  dextrose 40% Gel 15 Gram(s) Oral once  dextrose 5%. 1000 milliLiter(s) (50 mL/Hr) IV Continuous <Continuous>  dextrose 5%. 1000 milliLiter(s) (100 mL/Hr) IV Continuous <Continuous>  dextrose 50% Injectable 25 Gram(s) IV Push once  dextrose 50% Injectable 12.5 Gram(s) IV Push once  dextrose 50% Injectable 25 Gram(s) IV Push once  furosemide   Injectable 40 milliGRAM(s) IV Push two times a day  glucagon  Injectable 1 milliGRAM(s) IntraMuscular once  heparin  Infusion. 1100 Unit(s)/Hr (11 mL/Hr) IV Continuous <Continuous>  insulin lispro (ADMELOG) corrective regimen sliding scale   SubCutaneous three times a day before meals  insulin lispro (ADMELOG) corrective regimen sliding scale   SubCutaneous at bedtime  losartan 100 milliGRAM(s) Oral daily  pantoprazole   Suspension 40 milliGRAM(s) Oral daily  timolol 0.5% Solution 1 Drop(s) Both EYES every 12 hours      TELEMETRY: nsr 	    ECG:  	  RADIOLOGY:   DIAGNOSTIC TESTING:  [ ] Echocardiogram: < from: TTE with Doppler (w/Cont) (06.13.20 @ 08:26) >  ------------------------------------------------------------------------  CONCLUSIONS:  1. Tethered mitral valve leaflets. Mild mitral  regurgitation.  2. Bioprosthetic aortic valve replacement. Mean transaortic  valve gradient equals 2 mm Hg, which is probably normal in  the presence of a prosthetic valve.  3. Moderate concentric left ventricular hypertrophy.  4. Severe global left ventricular systolic dysfunction.  5. Right ventricular enlargement with decreased right  ventricular systolic function.  6. Normal tricuspid valve. Mild tricuspid regurgitation.  7. Estimated pulmonary artery systolic pressure equals 46  mm Hg, assuming right atrial pressure equals 10  mm Hg,  consistent with mild pulmonary hypertension.  8. Normal pericardium with small pericardial effusion.    < end of copied text >    [ ]  Catheterization:  [ ] Stress Test:    OTHER: 	    LABS:	 	                                13.5   5.73  )-----------( 214      ( 05 Aug 2021 07:59 )             41.8     08-05    137  |  99  |  20  ----------------------------<  88  3.9   |  23  |  1.08    Ca    9.7      05 Aug 2021 07:59  Mg     2.20     08-04    TPro  7.6  /  Alb  3.5  /  TBili  1.1  /  DBili  x   /  AST  19  /  ALT  10  /  AlkPhos  116  08-04    PT/INR - ( 03 Aug 2021 20:58 )   PT: 16.3 sec;   INR: 1.44 ratio         PTT - ( 05 Aug 2021 07:59 )  PTT:105.1 sec

## 2021-08-06 LAB
ANION GAP SERPL CALC-SCNC: 13 MMOL/L — SIGNIFICANT CHANGE UP (ref 7–14)
APTT BLD: 111.6 SEC — HIGH (ref 27–36.3)
APTT BLD: 91.8 SEC — HIGH (ref 27–36.3)
BUN SERPL-MCNC: 23 MG/DL — SIGNIFICANT CHANGE UP (ref 7–23)
CALCIUM SERPL-MCNC: 9.4 MG/DL — SIGNIFICANT CHANGE UP (ref 8.4–10.5)
CHLORIDE SERPL-SCNC: 101 MMOL/L — SIGNIFICANT CHANGE UP (ref 98–107)
CO2 SERPL-SCNC: 23 MMOL/L — SIGNIFICANT CHANGE UP (ref 22–31)
CREAT SERPL-MCNC: 1.01 MG/DL — SIGNIFICANT CHANGE UP (ref 0.5–1.3)
GLUCOSE SERPL-MCNC: 93 MG/DL — SIGNIFICANT CHANGE UP (ref 70–99)
HCT VFR BLD CALC: 37.9 % — LOW (ref 39–50)
HGB BLD-MCNC: 12.4 G/DL — LOW (ref 13–17)
MAGNESIUM SERPL-MCNC: 2.1 MG/DL — SIGNIFICANT CHANGE UP (ref 1.6–2.6)
MCHC RBC-ENTMCNC: 30.8 PG — SIGNIFICANT CHANGE UP (ref 27–34)
MCHC RBC-ENTMCNC: 32.7 GM/DL — SIGNIFICANT CHANGE UP (ref 32–36)
MCV RBC AUTO: 94 FL — SIGNIFICANT CHANGE UP (ref 80–100)
NRBC # BLD: 0 /100 WBCS — SIGNIFICANT CHANGE UP
NRBC # FLD: 0 K/UL — SIGNIFICANT CHANGE UP
PHOSPHATE SERPL-MCNC: 3 MG/DL — SIGNIFICANT CHANGE UP (ref 2.5–4.5)
PLATELET # BLD AUTO: 179 K/UL — SIGNIFICANT CHANGE UP (ref 150–400)
POTASSIUM SERPL-MCNC: 4.2 MMOL/L — SIGNIFICANT CHANGE UP (ref 3.5–5.3)
POTASSIUM SERPL-SCNC: 4.2 MMOL/L — SIGNIFICANT CHANGE UP (ref 3.5–5.3)
RBC # BLD: 4.03 M/UL — LOW (ref 4.2–5.8)
RBC # FLD: 14 % — SIGNIFICANT CHANGE UP (ref 10.3–14.5)
SODIUM SERPL-SCNC: 137 MMOL/L — SIGNIFICANT CHANGE UP (ref 135–145)
WBC # BLD: 5.98 K/UL — SIGNIFICANT CHANGE UP (ref 3.8–10.5)
WBC # FLD AUTO: 5.98 K/UL — SIGNIFICANT CHANGE UP (ref 3.8–10.5)

## 2021-08-06 PROCEDURE — 99233 SBSQ HOSP IP/OBS HIGH 50: CPT

## 2021-08-06 RX ORDER — DOBUTAMINE HCL 250MG/20ML
2.5 VIAL (ML) INTRAVENOUS
Qty: 500 | Refills: 0 | Status: DISCONTINUED | OUTPATIENT
Start: 2021-08-06 | End: 2021-08-09

## 2021-08-06 RX ADMIN — Medication 81 MILLIGRAM(S): at 18:31

## 2021-08-06 RX ADMIN — Medication 60 MILLIGRAM(S): at 18:31

## 2021-08-06 RX ADMIN — Medication 60 MILLIGRAM(S): at 06:32

## 2021-08-06 RX ADMIN — Medication 100 MILLIEQUIVALENT(S): at 00:02

## 2021-08-06 RX ADMIN — Medication 40 MILLIEQUIVALENT(S): at 06:34

## 2021-08-06 RX ADMIN — Medication 40 MILLIEQUIVALENT(S): at 10:03

## 2021-08-06 RX ADMIN — Medication 100 MILLIEQUIVALENT(S): at 01:02

## 2021-08-06 RX ADMIN — Medication 1 DROP(S): at 18:31

## 2021-08-06 RX ADMIN — LOSARTAN POTASSIUM 50 MILLIGRAM(S): 100 TABLET, FILM COATED ORAL at 06:33

## 2021-08-06 RX ADMIN — Medication 1 DROP(S): at 06:35

## 2021-08-06 RX ADMIN — Medication 25 MILLIGRAM(S): at 06:33

## 2021-08-06 RX ADMIN — Medication 100 MILLIEQUIVALENT(S): at 02:02

## 2021-08-06 RX ADMIN — ATORVASTATIN CALCIUM 40 MILLIGRAM(S): 80 TABLET, FILM COATED ORAL at 22:34

## 2021-08-06 RX ADMIN — HEPARIN SODIUM 900 UNIT(S)/HR: 5000 INJECTION INTRAVENOUS; SUBCUTANEOUS at 13:54

## 2021-08-06 RX ADMIN — HEPARIN SODIUM 1100 UNIT(S)/HR: 5000 INJECTION INTRAVENOUS; SUBCUTANEOUS at 06:55

## 2021-08-06 RX ADMIN — PANTOPRAZOLE SODIUM 40 MILLIGRAM(S): 20 TABLET, DELAYED RELEASE ORAL at 06:33

## 2021-08-06 RX ADMIN — Medication 5.4 MICROGRAM(S)/KG/MIN: at 19:57

## 2021-08-06 NOTE — PROGRESS NOTE ADULT - ASSESSMENT
Problem/Plan - 1:  ·  Problem: Acute on chronic diastolic congestive heart failure.  Plan: -patient with diastolic CHF, unsure of his baseline EF  -CAD s/p 4 stents  -last stent 1 year ago  -c/w asp/plavix started lipitor.   - diuresis as per cards     Problem/Plan - 2:  ·  Problem: AF (Paroxysmal Atrial Fibrillation).  Plan: - telemonitor  hep gtt    Problem/Plan - 3:  ·  Problem: Other ascites.  Plan: -moderate ascites likely 2/2 CHF  -diuresis as above    Problem/Plan - 4:  ·  Problem: Essential hypertension.  Plan: -c/w losartan 100 mg.     Problem/Plan - 5:  ·  Problem: Chronic gastric ulcer, unspecified whether gastric ulcer hemorrhage or perforation present.  Plan: -no epigastric tenderness  -started on protonix daily.     Problem/Plan - 6:  Problem: Nutrition, metabolism, and development symptoms. Plan: F-fluid restriction 1.2 L daily  E-replete K<4 and Mag <2  N-DASH/TLC diet

## 2021-08-06 NOTE — PROGRESS NOTE ADULT - ASSESSMENT
Cath 6/15/2020: s/p successful ROCIO placement to the proximal RCA and RPDA.  Cath 6/16/2020: s/p successful staged PCI to the mid LAD. Medical management of LCX disease.  Echo 6/13/2020: mild Mr, fx Bioprosthetic aortic valve replacement. EF 15-20%, Moderate concentric LVH. Severe global left ventricular systolic dysfunction. mild pulm htn    a/p  71 year old with  Hypertension, Coronary Artery Disease, S/P PCI ( s/p ROCIO placement to the proximal RCA and RPDA 6/15/2020, PCI to LAD 6/16/2020), Systolic Congestive Heart Failure, Aortic Stenosis, S/P bioprosthetic AVR , Paroxysmal Atrial Fibrillation, Prostate CA presenting with fluid overload, SOB    # Acute on Chronic Systolic Congestive Heart Failure  -volume status improving , net output ok- 1600cc   -c/w lasix 60mg IVP bid, metolazone 2.5 mg daily   -if remains volume overloaded, may need inotropic assisted diuresis.   -c/w bb   -echo with EF 14%, severe LV dysfunction, EP eval for ICD   -strict i/o   -Continue arb   -hs trop neg- no acs   -pending LE dopplers     # Hypertension  -Continue medications as prescribed.- bp much improved     #Coronary Artery Disease, S/P PCI.  -Recent Cath with Lcx disease and is s/p PCI to prox RCA, RPDA and LAD.  -Continue medical management for Lcx disease   -Stable without angina  -Continue ASA 81mg daily and plavix daily  -no acs     #. Aortic Stenosis s/p BioAVR  -echo with functioning bioprosthetic AVR.     # Paroxysmal Atrial Fibrillation  -Stable and in NSR  -Continue beta blocker as ordered  -ChadsVac= 4, c/w hep gtt    Cath 6/15/2020: s/p successful ROCIO placement to the proximal RCA and RPDA.  Cath 6/16/2020: s/p successful staged PCI to the mid LAD. Medical management of LCX disease.  Echo 6/13/2020: mild Mr, fx Bioprosthetic aortic valve replacement. EF 15-20%, Moderate concentric LVH. Severe global left ventricular systolic dysfunction. mild pulm htn    a/p  71 year old with  Hypertension, Coronary Artery Disease, S/P PCI ( s/p ROCIO placement to the proximal RCA and RPDA 6/15/2020, PCI to LAD 6/16/2020), Systolic Congestive Heart Failure, Aortic Stenosis, S/P bioprosthetic AVR , Paroxysmal Atrial Fibrillation, Prostate CA presenting with fluid overload, SOB    # Acute on Chronic Systolic Congestive Heart Failure  -volume status improving however remains overloaded.   -c/w lasix 60mg IVP bid, metolazone 2.5 mg daily   -dc bb, start dobutamine 2.5mcg/KG.   -echo with EF 14%, severe LV dysfunction, EP eval for ICD   -strict i/o   -Continue arb   -hs trop neg- no acs   -pending LE dopplers     # Hypertension  -Continue medications as prescribed.- bp much improved     #Coronary Artery Disease, S/P PCI.  -Recent Cath with Lcx disease and is s/p PCI to prox RCA, RPDA and LAD.  -Continue medical management for Lcx disease   -Stable without angina  -Continue ASA 81mg daily and plavix daily  -no acs     #. Aortic Stenosis s/p BioAVR  -echo with functioning bioprosthetic AVR.     # Paroxysmal Atrial Fibrillation  -Stable and in NSR  -Continue beta blocker as ordered  -ChadsVac= 4, c/w hep gtt    Cath 6/15/2020: s/p successful ROCIO placement to the proximal RCA and RPDA.  Cath 6/16/2020: s/p successful staged PCI to the mid LAD. Medical management of LCX disease.  Echo 6/13/2020: mild Mr, fx Bioprosthetic aortic valve replacement. EF 15-20%, Moderate concentric LVH. Severe global left ventricular systolic dysfunction. mild pulm htn    a/p  71 year old with  Hypertension, Coronary Artery Disease, S/P PCI ( s/p ROCIO placement to the proximal RCA and RPDA 6/15/2020, PCI to LAD 6/16/2020), Systolic Congestive Heart Failure, Aortic Stenosis, S/P bioprosthetic AVR , Paroxysmal Atrial Fibrillation, Prostate CA presenting with fluid overload, SOB    # Acute on Chronic Systolic Congestive Heart Failure  -volume status improving however remains overloaded.   -c/w lasix 60mg IVP bid, metolazone 2.5 mg daily   -dc bb, start dobutamine 2.5mcg/KG.   -echo with EF 14%, severe global LV dysfunction, decreased RV function, EP eval for ICD   -strict i/o   -Continue arb   -hs trop neg- no acs   -pending LE dopplers     # Hypertension  -Continue medications as prescribed.- bp much improved     #Coronary Artery Disease, S/P PCI.  -Recent Cath with Lcx disease and is s/p PCI to prox RCA, RPDA and LAD.  -Continue medical management for Lcx disease   -Stable without angina  -Continue ASA 81mg daily and plavix daily  -no acs     #. Aortic Stenosis s/p BioAVR  -echo with functioning bioprosthetic AVR.     # Paroxysmal Atrial Fibrillation  -Stable and in NSR  -hold bb on inotropes.   -ChadsVac= 4, c/w hep gtt

## 2021-08-06 NOTE — PROGRESS NOTE ADULT - SUBJECTIVE AND OBJECTIVE BOX
CARDIOLOGY FOLLOW UP - Dr. Velázquez  DATE OF SERVICE: 08-06-21     CC no cp  feels slightly better today, still overloaded.     REVIEW OF SYSTEMS:   CONSTITUTIONAL: No fever, weight loss, or fatigue   RESPIRATORY:  No cough, wheezing, chills or hemoptysis; No SOB  CARDIOVASCULAR: No chest pain, palpitations, passing out, dizziness, or leg swelling   GASTROINTESTINAL: No abdominal or epigastric pain. No nausea, vomiting, or hematemesis, no diarreha, or constipation, No melena or hematochezia   VASCULAR: no edema.       PHYSICAL EXAM:  T(C): 36.4 (08-06-21 @ 11:31), Max: 36.8 (08-05-21 @ 22:53)  HR: 79 (08-06-21 @ 11:31) (79 - 85)  BP: 111/61 (08-06-21 @ 11:31) (107/88 - 141/101)  RR: 18 (08-06-21 @ 11:31) (18 - 19)  SpO2: 100% (08-06-21 @ 11:31) (100% - 100%)  Wt(kg): --  I&O's Summary    05 Aug 2021 07:01  -  06 Aug 2021 07:00  --------------------------------------------------------  IN: 0 mL / OUT: 1300 mL / NET: -1300 mL    06 Aug 2021 07:01  -  06 Aug 2021 12:35  --------------------------------------------------------  IN: 0 mL / OUT: 1600 mL / NET: -1600 mL        Appearance: Normal	  Cardiovascular: Normal S1 S2,RRR, No JVD, No murmurs  Respiratory: crackles 	  Gastrointestinal:  Soft, Non-tender, + BS	, distended   Extremities: Normal range of motion, No clubbing, b/l le edema +1-2      HOME MEDICATIONS:  Aspirin Enteric Coated 81 mg oral delayed release tablet: 1 tab(s) orally once a day (03 Aug 2021 21:43)  Lasix 40 mg oral tablet: 1 tab(s) orally once a day (03 Aug 2021 21:43)  losartan 100 mg oral tablet: 1 tab(s) orally once a day (03 Aug 2021 21:43)  Plavix 75 mg oral tablet: 1 tab(s) orally once a day (03 Aug 2021 21:43)  timolol hemihydrate 0.5% ophthalmic solution: 1 drop(s) to each affected eye 2 times a day (03 Aug 2021 21:43)      MEDICATIONS  (STANDING):  aspirin enteric coated 81 milliGRAM(s) Oral daily  atorvastatin 40 milliGRAM(s) Oral at bedtime  clopidogrel Tablet 75 milliGRAM(s) Oral daily  dextrose 40% Gel 15 Gram(s) Oral once  dextrose 5%. 1000 milliLiter(s) (50 mL/Hr) IV Continuous <Continuous>  dextrose 5%. 1000 milliLiter(s) (100 mL/Hr) IV Continuous <Continuous>  dextrose 50% Injectable 25 Gram(s) IV Push once  dextrose 50% Injectable 12.5 Gram(s) IV Push once  dextrose 50% Injectable 25 Gram(s) IV Push once  furosemide   Injectable 60 milliGRAM(s) IV Push two times a day  glucagon  Injectable 1 milliGRAM(s) IntraMuscular once  heparin  Infusion. 1100 Unit(s)/Hr (11 mL/Hr) IV Continuous <Continuous>  insulin lispro (ADMELOG) corrective regimen sliding scale   SubCutaneous three times a day before meals  insulin lispro (ADMELOG) corrective regimen sliding scale   SubCutaneous at bedtime  losartan 50 milliGRAM(s) Oral daily  metolazone 2.5 milliGRAM(s) Oral daily  metoprolol succinate ER 25 milliGRAM(s) Oral daily  pantoprazole    Tablet 40 milliGRAM(s) Oral before breakfast  timolol 0.5% Solution 1 Drop(s) Both EYES every 12 hours      TELEMETRY: NSR, pvc, triplets. 	    ECG:  	  RADIOLOGY:   DIAGNOSTIC TESTING:  [ ] Echocardiogram:  [ ]  Catheterization:  [ ] Stress Test:    OTHER: 	    LABS:	 	                                12.4   5.98  )-----------( 179      ( 06 Aug 2021 06:25 )             37.9     08-06    137  |  101  |  23  ----------------------------<  93  4.2   |  23  |  1.01    Ca    9.4      06 Aug 2021 06:25  Phos  3.0     08-06  Mg     2.10     08-06      PTT - ( 06 Aug 2021 06:25 )  PTT:91.8 sec     CARDIOLOGY FOLLOW UP - Dr. Velázquez  DATE OF SERVICE: 08-06-21     CC no cp  feels slightly better today, still overloaded.     REVIEW OF SYSTEMS:   CONSTITUTIONAL: No fever, weight loss, or fatigue   RESPIRATORY:  No cough, wheezing, chills or hemoptysis; No SOB  CARDIOVASCULAR: No chest pain, palpitations, passing out, dizziness, or leg swelling   GASTROINTESTINAL: No abdominal or epigastric pain. No nausea, vomiting, or hematemesis, no diarreha, or constipation, No melena or hematochezia   VASCULAR: no edema.       PHYSICAL EXAM:  T(C): 36.4 (08-06-21 @ 11:31), Max: 36.8 (08-05-21 @ 22:53)  HR: 79 (08-06-21 @ 11:31) (79 - 85)  BP: 111/61 (08-06-21 @ 11:31) (107/88 - 141/101)  RR: 18 (08-06-21 @ 11:31) (18 - 19)  SpO2: 100% (08-06-21 @ 11:31) (100% - 100%)  Wt(kg): --  I&O's Summary    05 Aug 2021 07:01  -  06 Aug 2021 07:00  --------------------------------------------------------  IN: 0 mL / OUT: 1300 mL / NET: -1300 mL    06 Aug 2021 07:01  -  06 Aug 2021 12:35  --------------------------------------------------------  IN: 0 mL / OUT: 1600 mL / NET: -1600 mL        Appearance: Normal	  Cardiovascular: Normal S1 S2,RRR, No JVD, No murmurs  Respiratory: crackles 	  Gastrointestinal:  Soft, Non-tender, + BS	, distended   Extremities: Normal range of motion, No clubbing, b/l le edema +1-2      HOME MEDICATIONS:  Aspirin Enteric Coated 81 mg oral delayed release tablet: 1 tab(s) orally once a day (03 Aug 2021 21:43)  Lasix 40 mg oral tablet: 1 tab(s) orally once a day (03 Aug 2021 21:43)  losartan 100 mg oral tablet: 1 tab(s) orally once a day (03 Aug 2021 21:43)  Plavix 75 mg oral tablet: 1 tab(s) orally once a day (03 Aug 2021 21:43)  timolol hemihydrate 0.5% ophthalmic solution: 1 drop(s) to each affected eye 2 times a day (03 Aug 2021 21:43)      MEDICATIONS  (STANDING):  aspirin enteric coated 81 milliGRAM(s) Oral daily  atorvastatin 40 milliGRAM(s) Oral at bedtime  clopidogrel Tablet 75 milliGRAM(s) Oral daily  dextrose 40% Gel 15 Gram(s) Oral once  dextrose 5%. 1000 milliLiter(s) (50 mL/Hr) IV Continuous <Continuous>  dextrose 5%. 1000 milliLiter(s) (100 mL/Hr) IV Continuous <Continuous>  dextrose 50% Injectable 25 Gram(s) IV Push once  dextrose 50% Injectable 12.5 Gram(s) IV Push once  dextrose 50% Injectable 25 Gram(s) IV Push once  furosemide   Injectable 60 milliGRAM(s) IV Push two times a day  glucagon  Injectable 1 milliGRAM(s) IntraMuscular once  heparin  Infusion. 1100 Unit(s)/Hr (11 mL/Hr) IV Continuous <Continuous>  insulin lispro (ADMELOG) corrective regimen sliding scale   SubCutaneous three times a day before meals  insulin lispro (ADMELOG) corrective regimen sliding scale   SubCutaneous at bedtime  losartan 50 milliGRAM(s) Oral daily  metolazone 2.5 milliGRAM(s) Oral daily  metoprolol succinate ER 25 milliGRAM(s) Oral daily  pantoprazole    Tablet 40 milliGRAM(s) Oral before breakfast  timolol 0.5% Solution 1 Drop(s) Both EYES every 12 hours      TELEMETRY: NSR, pvc, triplets. 	    ECG:  	  RADIOLOGY:   DIAGNOSTIC TESTING:  [ ] Echocardiogram: < from: Transthoracic Echocardiogram (08.05.21 @ 16:12) >  1. Mildly thickened mitral valve. Mild-moderate mitral  regurgitation.  2. Bioprosthetic aortic valve replacement. Mean transaortic  valve gradient equals 7 mm Hg, which is probably normal in  the presence of a prosthetic valve.  3. Moderately dilated left atrium.  LA volume index = 42  cc/m2.  4. Moderate concentric left ventricular hypertrophy.  5. Severe global left ventricular systolic dysfunction.  6. Severe diastolic dysfunction.  7. Moderate right atrial enlargement.  8. Right ventricular enlargement with decreased right  ventricular systolic function.  9. Normal tricuspid valve.  Moderate tricuspid  regurgitation.  10. Left pleural effusion.  Consider infiltrative cardiomyopathy.    < end of copied text >    [ ]  Catheterization:  [ ] Stress Test:    OTHER: 	    LABS:	 	                                12.4   5.98  )-----------( 179      ( 06 Aug 2021 06:25 )             37.9     08-06    137  |  101  |  23  ----------------------------<  93  4.2   |  23  |  1.01    Ca    9.4      06 Aug 2021 06:25  Phos  3.0     08-06  Mg     2.10     08-06      PTT - ( 06 Aug 2021 06:25 )  PTT:91.8 sec

## 2021-08-06 NOTE — PROGRESS NOTE ADULT - SUBJECTIVE AND OBJECTIVE BOX
Patient is a 71y old  Male who presents with a chief complaint of sob HF exacerbation (06 Aug 2021 14:24)    Date of servie : 08-06-21 @ 16:43  INTERVAL HPI/OVERNIGHT EVENTS:  T(C): 36.4 (08-06-21 @ 11:31), Max: 36.8 (08-05-21 @ 22:53)  HR: 79 (08-06-21 @ 11:31) (79 - 85)  BP: 111/61 (08-06-21 @ 11:31) (111/61 - 141/101)  RR: 18 (08-06-21 @ 11:31) (18 - 18)  SpO2: 100% (08-06-21 @ 11:31) (100% - 100%)  Wt(kg): --  I&O's Summary    05 Aug 2021 07:01  -  06 Aug 2021 07:00  --------------------------------------------------------  IN: 0 mL / OUT: 1300 mL / NET: -1300 mL    06 Aug 2021 07:01  -  06 Aug 2021 16:43  --------------------------------------------------------  IN: 0 mL / OUT: 2500 mL / NET: -2500 mL        LABS:                        12.4   5.98  )-----------( 179      ( 06 Aug 2021 06:25 )             37.9     08-06    137  |  101  |  23  ----------------------------<  93  4.2   |  23  |  1.01    Ca    9.4      06 Aug 2021 06:25  Phos  3.0     08-06  Mg     2.10     08-06      PTT - ( 06 Aug 2021 13:16 )  PTT:111.6 sec    CAPILLARY BLOOD GLUCOSE      POCT Blood Glucose.: 117 mg/dL (06 Aug 2021 12:31)  POCT Blood Glucose.: 84 mg/dL (06 Aug 2021 09:03)  POCT Blood Glucose.: 102 mg/dL (05 Aug 2021 21:44)  POCT Blood Glucose.: 98 mg/dL (05 Aug 2021 18:24)            MEDICATIONS  (STANDING):  aspirin enteric coated 81 milliGRAM(s) Oral daily  atorvastatin 40 milliGRAM(s) Oral at bedtime  clopidogrel Tablet 75 milliGRAM(s) Oral daily  dextrose 40% Gel 15 Gram(s) Oral once  dextrose 5%. 1000 milliLiter(s) (50 mL/Hr) IV Continuous <Continuous>  dextrose 5%. 1000 milliLiter(s) (100 mL/Hr) IV Continuous <Continuous>  dextrose 50% Injectable 25 Gram(s) IV Push once  dextrose 50% Injectable 12.5 Gram(s) IV Push once  dextrose 50% Injectable 25 Gram(s) IV Push once  DOBUTamine Infusion 2.5 MICROgram(s)/kG/Min (5.4 mL/Hr) IV Continuous <Continuous>  furosemide   Injectable 60 milliGRAM(s) IV Push two times a day  glucagon  Injectable 1 milliGRAM(s) IntraMuscular once  heparin  Infusion. 1100 Unit(s)/Hr (11 mL/Hr) IV Continuous <Continuous>  insulin lispro (ADMELOG) corrective regimen sliding scale   SubCutaneous three times a day before meals  insulin lispro (ADMELOG) corrective regimen sliding scale   SubCutaneous at bedtime  losartan 50 milliGRAM(s) Oral daily  metolazone 2.5 milliGRAM(s) Oral daily  metoprolol succinate ER 25 milliGRAM(s) Oral daily  pantoprazole    Tablet 40 milliGRAM(s) Oral before breakfast  timolol 0.5% Solution 1 Drop(s) Both EYES every 12 hours    MEDICATIONS  (PRN):  acetaminophen   Tablet .. 650 milliGRAM(s) Oral every 6 hours PRN Temp greater or equal to 38C (100.4F), Mild Pain (1 - 3), Moderate Pain (4 - 6)  heparin   Injectable 6000 Unit(s) IV Push every 6 hours PRN For aPTT less than 40  heparin   Injectable 3000 Unit(s) IV Push every 6 hours PRN For aPTT between 40 - 57          PHYSICAL EXAM:  GENERAL: NAD, well-groomed, well-developed  HEAD:  Atraumatic, Normocephalic  CHEST/LUNG: Clear to percussion bilaterally; No rales, rhonchi, wheezing, or rubs  HEART: Regular rate and rhythm; No murmurs, rubs, or gallops  ABDOMEN: Soft, Nontender, Nondistended; Bowel sounds present  EXTREMITIES:  edema +    Care Discussed with Consultants/Other Providers [x ] YES  [ ] NO

## 2021-08-06 NOTE — CONSULT NOTE ADULT - ATTENDING COMMENTS
71M with CAD s/p ROCIO pRCA and RPDA 6/15/2020, LAD 6/16/2020, HFrEF with EF <35%, AS s/p bioAVR 12/2010, pAF on Xarelto, HTN, preDM, prostate cancer s/p prostatectomy and LND who is admitted with HF. EP consulted for primary prevention ICD. Will plan placement next week once patient is optimized from a HF standpoint.

## 2021-08-06 NOTE — CONSULT NOTE ADULT - SUBJECTIVE AND OBJECTIVE BOX
Patient seen and evaluated at bedside    Chief Complaint: SOB    HPI: 71M with CAD s/p ROCIO pRCA and RPDA 6/15/2020, LAD 2020, HFrEF, pAF on Xarelto, AS s/p bioAVR 2010, HTN, preDM, prostate cancer s/p prostatectomy and LND, who presented at Dr. Velázquez's office for SOB x 2 weeks. EP consulted for ICD eval.     The patient has had increased abdominal swelling, LE swelling, and dyspnea on exertion. He denies chest pain, palpitations, or fevers. He took extra doses of his lasix and had improvement in his symptoms.    ED vitals: HR 82, 98.7 F, /91, RR 26-30 98% on room air  EKG without acute findings  bnp 3.4k      The patient has been getting diuresis with 60 IV lasix and metolazone. Echo done  showed severe LV dysfunction and concern for infiltrative CM. Echo done 1 year ago also showed severe LV dysfunction. The patient denies any workup for amyloid in the past. He had been compliant with his medications except for the past month, where he stopped taking his metformin and metoprolol due to issues with GI upset and constipation. In terms of his prostate cancer, he has had recent follow up, and his last PSA was undetectable.     The patient currently still has SOB, however thinks it's better than when he first came in.     Outpatient cardiologist: Dr. Velázquez    FUROSEMIDE 40 MG..............Si tablet QD for 90 Days Qty: 90  XARELTO 15 MG.................Si tablet QD for 90 Days Qty: 90  METOPROLOL SUCCINATE  MG..Si tablet, extended release QD for 90 Days Qty: 90  METFORMIN 500 MG..............Si tablet 2 times per day for 90 Days Qty: 180  MELATONIN 3 MG................Si tablet at bedtime for 90 Days Qty: 90  LOSARTAN 100 MG...............Si tablet QD for 90 Days Qty: 90  GLIPIZIDE 2.5 MG..............Si tablet, extended release QD for 90 Days Qty: 90  CLOPIDOGREL 75 MG.............Si tablet QD for 90 Days Qty: 90  ATORVASTATIN 10 MG............Si tablet QD for 90 Days Qty: 90  ASPIRIN 81 MG.................Si delayed release tablet QD for 90 Days Qty: 90    PMHx:   Heart Murmur    HTN - Hypertension    Headache, Migraine    Pulmonary Tuberculosis    Gastric Ulcer    Allergy, Food    Dizziness    AF (Paroxysmal Atrial Fibrillation)    PSHx:   S/P Appendectomy    S/P Hernia Surgery    S/P T&amp;A    S/P Hemorrhoidectomy    Aortic Valve Replaced    Allergies:  coffee and food color, brownish discoloration of trunk (Rash)  food allergy (Rash)  No Known Drug Allergies    Home Meds:    Current Medications:   acetaminophen   Tablet .. 650 milliGRAM(s) Oral every 6 hours PRN  aspirin enteric coated 81 milliGRAM(s) Oral daily  atorvastatin 40 milliGRAM(s) Oral at bedtime  clopidogrel Tablet 75 milliGRAM(s) Oral daily  dextrose 40% Gel 15 Gram(s) Oral once  dextrose 5%. 1000 milliLiter(s) IV Continuous <Continuous>  dextrose 5%. 1000 milliLiter(s) IV Continuous <Continuous>  dextrose 50% Injectable 25 Gram(s) IV Push once  dextrose 50% Injectable 12.5 Gram(s) IV Push once  dextrose 50% Injectable 25 Gram(s) IV Push once  DOBUTamine Infusion 2.5 MICROgram(s)/kG/Min IV Continuous <Continuous>  furosemide   Injectable 60 milliGRAM(s) IV Push two times a day  glucagon  Injectable 1 milliGRAM(s) IntraMuscular once  heparin   Injectable 6000 Unit(s) IV Push every 6 hours PRN  heparin   Injectable 3000 Unit(s) IV Push every 6 hours PRN  heparin  Infusion. 1100 Unit(s)/Hr IV Continuous <Continuous>  insulin lispro (ADMELOG) corrective regimen sliding scale   SubCutaneous three times a day before meals  insulin lispro (ADMELOG) corrective regimen sliding scale   SubCutaneous at bedtime  losartan 50 milliGRAM(s) Oral daily  metolazone 2.5 milliGRAM(s) Oral daily  metoprolol succinate ER 25 milliGRAM(s) Oral daily  pantoprazole    Tablet 40 milliGRAM(s) Oral before breakfast  timolol 0.5% Solution 1 Drop(s) Both EYES every 12 hours      FAMILY HISTORY:  FH: stomach cancer    FH: diabetes mellitus    Social History: lives with wife and dog  Smoking History: never  Alcohol Use: none  Drug Use: none    REVIEW OF SYSTEMS:  CONSTITUTIONAL: No weakness, fevers or chills  EYES/ENT: No visual changes;  No dysphagia  NECK: No pain or stiffness  RESPIRATORY: No cough, wheezing, hemoptysis; +shortness of breath  CARDIOVASCULAR: No chest pain or palpitations; +lower extremity edema  GASTROINTESTINAL: No abdominal or epigastric pain. No nausea, vomiting, or hematemesis; No diarrhea or constipation. No melena or hematochezia.  BACK: No back pain  GENITOURINARY: No dysuria, frequency or hematuria  NEUROLOGICAL: No numbness or weakness  SKIN: No itching, burning, rashes, or lesions   All other review of systems is negative unless indicated above.    Physical Exam:  T(F): 97.6 (-), Max: 98.2 ()  HR: 79 () (79 - 85)  BP: 111/61 () (107/88 - 141/101)  RR: 18 (-)  SpO2: 100% ()  GENERAL: No acute distress, well-developed  HEAD:  Atraumatic, Normocephalic  ENT: EOMI, PERRLA, conjunctiva and sclera clear, Neck supple, No JVD, moist mucosa  CHEST/LUNG: Clear to auscultation bilaterally; No wheeze, equal breath sounds bilaterally   BACK: No spinal tenderness  HEART: Regular rate and rhythm; No murmurs, rubs, or gallops  ABDOMEN: Soft, Nontender, Nondistended; Bowel sounds present  EXTREMITIES:  No clubbing, cyanosis, 1+ pitting LE edema  PSYCH: Nl behavior, nl affect  NEUROLOGY: AAOx3, non-focal, cranial nerves intact  SKIN: Normal color, No rashes or lesions  LINES:    Cardiovascular Diagnostic Testing:    ECG: Personally reviewed: NSR, PAC, LAD    Echo: Personally reviewed:  < from: Transthoracic Echocardiogram (21 @ 16:12) >  CONCLUSIONS:  1. Mildly thickened mitral valve. Mild-moderate mitral  regurgitation.  2. Bioprosthetic aortic valve replacement. Mean transaortic  valve gradient equals 7 mm Hg, which is probably normal in  the presence of a prosthetic valve.  3. Moderately dilated left atrium.  LA volume index = 42  cc/m2.  4. Moderate concentric left ventricular hypertrophy.  5. Severe global left ventricular systolic dysfunction.  6. Severe diastolic dysfunction.  7. Moderate right atrial enlargement.  8. Right ventricular enlargement with decreased right  ventricular systolic function.  9. Normal tricuspid valve.  Moderate tricuspid  regurgitation.  10. Left pleural effusion.  Consider infiltrative cardiomyopathy.  ------------------------------------------------------------------------  Confirmed on  2021 - 17:33:11by Simon Thorne M.D. RPVI  ------------------------------------------------------------------------    < end of copied text >    Stress Testing:    Cath:  < from: Cardiac Cath Lab - Adult (20 @ 11:36) >  LAD:   --  Mid LAD: There was a 80 % stenosis.  COMPLICATIONS: There were no complications.  DIAGNOSTIC IMPRESSIONS: Patient s/p successful staged PCI to the mid LAD.  DIAGNOSTIC RECOMMENDATIONS: Continue dual antiplatelet therapy.  Resume anticoagulation this evening six hours after sheath removal.  Medical management of LCX disease.  INTERVENTIONAL IMPRESSIONS: Patient s/p successful staged PCI to the mid  LAD.  INTERVENTIONAL RECOMMENDATIONS: Continue dual antiplatelet therapy.  Resume anticoagulation this evening six hours after sheath removal.  Medical management of LCX disease.  DISPOSITION: The patient left the catheterization laboratory in stable  condition.  Prepared and signed by  Dann Velázquez M.D.  Signed 2020 13:10:37    < end of copied text >    Imaging:    CXR: Personally reviewed    Labs: Personally reviewed                        12.4   5.98  )-----------( 179      ( 06 Aug 2021 06:25 )             37.9     08-06    137  |  101  |  23  ----------------------------<  93  4.2   |  23  |  1.01    Ca    9.4      06 Aug 2021 06:25  Phos  3.0     08-  Mg     2.10     08-06    PTT - ( 06 Aug 2021 13:16 )  PTT:111.6 sec    CARDIAC MARKERS ( 04 Aug 2021 03:41 )  48 ng/L / x     / x     / x     / x     / x      CARDIAC MARKERS ( 03 Aug 2021 20:58 )  50 ng/L / x     / x     / x     / x     / x        Serum Pro-Brain Natriuretic Peptide: 4843 pg/mL ( @ 07:59)  Serum Pro-Brain Natriuretic Peptide: 3902 pg/mL ( @ 20:58)
CARDIOLOGY CONSULT - Dr. Velázquez         HPI:  Patient is a 71 year old hx of HTN, pre-DM2, porcine aortic valve, pafib, CAD s/p 4 stents last one 1 year prior, history GI ulcer, prostate cancer s/p prostatectomy and lymph node removal who presents from  our office due to sob for 2 weeks in duration. He has had increased abdominal swelling, LE swelling, and dyspnea on exertion. He denies chest pain, palpitations, or fevers. He took extra doses of his lasix and with minimal improvement in  his symptoms. HE stated missing some doses a few weeks ago. Also endorses increase fluid intake   Denies any cp, dizziness or palps  on exam reports to feel better  ros otherwise negative       PAST MEDICAL HISTORY:  Hypertension, Coronary Artery Disease, S/P PCI ( s/p ROCIO placement to the proximal RCA and RPDA 6/15/2020, PCI to LAD 2020), Systolic Congestive Heart Failure, Aortic Stenosis, S/P bioprosthetic AVR , Paroxysmal Atrial Fibrillation, Prostate CA    PAST SURGICAL HISTORY:  s/p ROCIO placement to the proximal RCA and RPDA 6/15/2020, DESx2 to LAD 2020) at [facility]  S/P bioprosthetic AVR 2010 @ [facility]    SOCIAL HISTORY:  Tobacco: Denies ,Alcohol: Denies, Caffeine: 1 cup daily    FAMILY HISTORY:  Father/ Mother: HTN, DM, CAD    ALLERGIES: No Known Allergies    HOME MEDICATIONS:   FUROSEMIDE 40 MG..............Si tablet QD for 90 Days Qty: 90  XARELTO 15 MG.................Si tablet QD for 90 Days Qty: 90  METOPROLOL SUCCINATE  MG..Si tablet, extended release QD for 90 Days Qty: 90  METFORMIN 500 MG..............Si tablet 2 times per day for 90 Days Qty: 180  MELATONIN 3 MG................Si tablet at bedtime for 90 Days Qty: 90  LOSARTAN 100 MG...............Si tablet QD for 90 Days Qty: 90  GLIPIZIDE 2.5 MG..............Si tablet, extended release QD for 90 Days Qty: 90  CLOPIDOGREL 75 MG.............Si tablet QD for 90 Days Qty: 90  ATORVASTATIN 10 MG............Si tablet QD for 90 Days Qty: 90  ASPIRIN 81 MG.................Si delayed release tablet QD for 90 Days Qty: 90          MEDICATIONS  (STANDING):  aspirin enteric coated 81 milliGRAM(s) Oral daily  atorvastatin 40 milliGRAM(s) Oral at bedtime  clopidogrel Tablet 75 milliGRAM(s) Oral daily  dextrose 40% Gel 15 Gram(s) Oral once  dextrose 5%. 1000 milliLiter(s) (50 mL/Hr) IV Continuous <Continuous>  dextrose 5%. 1000 milliLiter(s) (100 mL/Hr) IV Continuous <Continuous>  dextrose 50% Injectable 25 Gram(s) IV Push once  dextrose 50% Injectable 12.5 Gram(s) IV Push once  dextrose 50% Injectable 25 Gram(s) IV Push once  furosemide   Injectable 40 milliGRAM(s) IV Push two times a day  glucagon  Injectable 1 milliGRAM(s) IntraMuscular once  heparin  Infusion 1100 Unit(s)/Hr (11 mL/Hr) IV Continuous <Continuous>  insulin lispro (ADMELOG) corrective regimen sliding scale   SubCutaneous three times a day before meals  insulin lispro (ADMELOG) corrective regimen sliding scale   SubCutaneous at bedtime  losartan 100 milliGRAM(s) Oral daily  pantoprazole   Suspension 40 milliGRAM(s) Oral daily  timolol 0.5% Solution 1 Drop(s) Both EYES every 12 hours    PREVIOUS DIAGNOSTIC TESTING:    Cath 6/15/2020: s/p successful ROCIO placement to the proximal RCA and RPDA.  Cath 2020: s/p successful staged PCI to the mid LAD. Medical management of LCX disease.  Echo 2020: mild Mr, fx Bioprosthetic aortic valve replacement. EF 15-20%, Moderate concentric LVH. Severe global left ventricular systolic dysfunction. mild pulm htn      	    REVIEW OF SYSTEMS:  CONSTITUTIONAL: No fever, weight loss, or fatigue  EYES: No eye pain, visual disturbances, or discharge  ENMT:  No difficulty hearing, tinnitus, vertigo; No sinus or throat pain  NECK: No pain or stiffness  RESPIRATORY: No cough, wheezing, chills or hemoptysis; ++ Shortness of Breath  CARDIOVASCULAR: No chest pain, palpitations, passing out, dizziness, +++ leg swelling  GASTROINTESTINAL: No abdominal or epigastric pain. No nausea, vomiting, or hematemesis; No diarrhea or constipation. No melena or hematochezia. ++ Abd distention  GENITOURINARY: No dysuria, frequency, hematuria, or incontinence  NEUROLOGICAL: No headaches, memory loss, loss of strength, numbness, or tremors  SKIN: No itching, burning, rashes, or lesions   	    [ x] All others negative	  [ ] Unable to obtain    PHYSICAL EXAM:  T(C): 36.8 (21 @ 05:12), Max: 37.1 (21 @ 17:42)  HR: 81 (21 @ 05:12) (81 - 92)  BP: 139/89 (21 @ 05:12) (130/81 - 154/89)  RR: 18 (21 @ 05:12) (16 - 18)  SpO2: 100% (21 @ 05:12) (100% - 100%)  Wt(kg): --  I&O's Summary    03 Aug 2021 07:01  -  04 Aug 2021 07:00  --------------------------------------------------------  IN: 0 mL / OUT: 1500 mL / NET: -1500 mL        Appearance: Normal	  Psychiatry: A & O x 3, Mood & affect appropriate  HEENT:   Normal oral mucosa, PERRL, EOMI	  Lymphatic: No lymphadenopathy  Cardiovascular: Normal S1 S2,RRR  Respiratory: crackles  Gastrointestinal:  distended, + BS	  Skin: No rashes, No ecchymoses, No cyanosis	  Neurologic: Non-focal  Extremities: + 2 le edema    TELEMETRY: nsr	    ECG:  nsr   old inferior infract	  RADIOLOGY:    OTHER: 	  	< from: Xray Chest 1 View- PORTABLE-Urgent (Xray Chest 1 View- PORTABLE-Urgent .) (21 @ 20:57) >  PROCEDURE DATE:  Aug  3 2021     ******PRELIMINARY REPORT******    ******PRELIMINARY REPORT******            INTERPRETATION:  CLINICAL INFORMATION: Shortness of breath.    EXAM: PA view of the chest.    COMPARISON: CT chest from 2015    FINDINGS:    Median sternotomy and aortic valve repair. Lungs are clear. No pleural effusion or pneumothorax. Heart size is normal. Bones and soft tissues are unremarkable.    IMPRESSION:    Clear lungs.          < end of copied text >    LABS:	 	    CARDIAC MARKERS:  Troponin T, High Sensitivity Result: 48 ng/L ( @ 03:41)  Troponin T, High Sensitivity Result: 50 ng/L ( @ 20:58)                                  12.5   6.28  )-----------( 194      ( 04 Aug 2021 03:41 )             39.6         140  |  104  |  17  ----------------------------<  102<H>  4.2   |  19<L>  |  1.01    Ca    9.2      04 Aug 2021 03:41  Mg     2.20     -    TPro  7.6  /  Alb  3.5  /  TBili  1.1  /  DBili  x   /  AST  19  /  ALT  10  /  AlkPhos  116  -    PT/INR - ( 03 Aug 2021 20:58 )   PT: 16.3 sec;   INR: 1.44 ratio         PTT - ( 03 Aug 2021 20:58 )  PTT:34.0 sec  proBNP: Serum Pro-Brain Natriuretic Peptide: 3902 pg/mL ( @ 20:58)    Lipid Profile:   HgA1c:   TSH:

## 2021-08-07 LAB
ANION GAP SERPL CALC-SCNC: 11 MMOL/L — SIGNIFICANT CHANGE UP (ref 7–14)
APTT BLD: 30.3 SEC — SIGNIFICANT CHANGE UP (ref 27–36.3)
APTT BLD: 64.4 SEC — HIGH (ref 27–36.3)
APTT BLD: >200 SEC — CRITICAL HIGH (ref 27–36.3)
BUN SERPL-MCNC: 23 MG/DL — SIGNIFICANT CHANGE UP (ref 7–23)
CALCIUM SERPL-MCNC: 9.9 MG/DL — SIGNIFICANT CHANGE UP (ref 8.4–10.5)
CHLORIDE SERPL-SCNC: 96 MMOL/L — LOW (ref 98–107)
CO2 SERPL-SCNC: 27 MMOL/L — SIGNIFICANT CHANGE UP (ref 22–31)
CREAT SERPL-MCNC: 1.13 MG/DL — SIGNIFICANT CHANGE UP (ref 0.5–1.3)
GLUCOSE SERPL-MCNC: 92 MG/DL — SIGNIFICANT CHANGE UP (ref 70–99)
HCT VFR BLD CALC: 38 % — LOW (ref 39–50)
HGB BLD-MCNC: 12.4 G/DL — LOW (ref 13–17)
MAGNESIUM SERPL-MCNC: 2.1 MG/DL — SIGNIFICANT CHANGE UP (ref 1.6–2.6)
MCHC RBC-ENTMCNC: 30 PG — SIGNIFICANT CHANGE UP (ref 27–34)
MCHC RBC-ENTMCNC: 32.6 GM/DL — SIGNIFICANT CHANGE UP (ref 32–36)
MCV RBC AUTO: 92 FL — SIGNIFICANT CHANGE UP (ref 80–100)
NRBC # BLD: 0 /100 WBCS — SIGNIFICANT CHANGE UP
NRBC # FLD: 0 K/UL — SIGNIFICANT CHANGE UP
PHOSPHATE SERPL-MCNC: 3.3 MG/DL — SIGNIFICANT CHANGE UP (ref 2.5–4.5)
PLATELET # BLD AUTO: 189 K/UL — SIGNIFICANT CHANGE UP (ref 150–400)
POTASSIUM SERPL-MCNC: 4.4 MMOL/L — SIGNIFICANT CHANGE UP (ref 3.5–5.3)
POTASSIUM SERPL-SCNC: 4.4 MMOL/L — SIGNIFICANT CHANGE UP (ref 3.5–5.3)
RBC # BLD: 4.13 M/UL — LOW (ref 4.2–5.8)
RBC # FLD: 13.7 % — SIGNIFICANT CHANGE UP (ref 10.3–14.5)
SODIUM SERPL-SCNC: 134 MMOL/L — LOW (ref 135–145)
WBC # BLD: 6.26 K/UL — SIGNIFICANT CHANGE UP (ref 3.8–10.5)
WBC # FLD AUTO: 6.26 K/UL — SIGNIFICANT CHANGE UP (ref 3.8–10.5)

## 2021-08-07 RX ORDER — HEPARIN SODIUM 5000 [USP'U]/ML
3000 INJECTION INTRAVENOUS; SUBCUTANEOUS EVERY 6 HOURS
Refills: 0 | Status: DISCONTINUED | OUTPATIENT
Start: 2021-08-07 | End: 2021-08-08

## 2021-08-07 RX ORDER — FUROSEMIDE 40 MG
80 TABLET ORAL EVERY 12 HOURS
Refills: 0 | Status: DISCONTINUED | OUTPATIENT
Start: 2021-08-07 | End: 2021-08-09

## 2021-08-07 RX ORDER — HEPARIN SODIUM 5000 [USP'U]/ML
6000 INJECTION INTRAVENOUS; SUBCUTANEOUS ONCE
Refills: 0 | Status: COMPLETED | OUTPATIENT
Start: 2021-08-07 | End: 2021-08-07

## 2021-08-07 RX ORDER — HEPARIN SODIUM 5000 [USP'U]/ML
900 INJECTION INTRAVENOUS; SUBCUTANEOUS
Qty: 25000 | Refills: 0 | Status: DISCONTINUED | OUTPATIENT
Start: 2021-08-07 | End: 2021-08-08

## 2021-08-07 RX ORDER — HEPARIN SODIUM 5000 [USP'U]/ML
6000 INJECTION INTRAVENOUS; SUBCUTANEOUS EVERY 6 HOURS
Refills: 0 | Status: DISCONTINUED | OUTPATIENT
Start: 2021-08-07 | End: 2021-08-08

## 2021-08-07 RX ADMIN — Medication 1 DROP(S): at 06:44

## 2021-08-07 RX ADMIN — HEPARIN SODIUM 700 UNIT(S)/HR: 5000 INJECTION INTRAVENOUS; SUBCUTANEOUS at 10:40

## 2021-08-07 RX ADMIN — PANTOPRAZOLE SODIUM 40 MILLIGRAM(S): 20 TABLET, DELAYED RELEASE ORAL at 06:44

## 2021-08-07 RX ADMIN — HEPARIN SODIUM 6000 UNIT(S): 5000 INJECTION INTRAVENOUS; SUBCUTANEOUS at 02:51

## 2021-08-07 RX ADMIN — Medication 1 DROP(S): at 17:14

## 2021-08-07 RX ADMIN — HEPARIN SODIUM 900 UNIT(S)/HR: 5000 INJECTION INTRAVENOUS; SUBCUTANEOUS at 02:48

## 2021-08-07 RX ADMIN — LOSARTAN POTASSIUM 50 MILLIGRAM(S): 100 TABLET, FILM COATED ORAL at 06:44

## 2021-08-07 RX ADMIN — HEPARIN SODIUM 0 UNIT(S)/HR: 5000 INJECTION INTRAVENOUS; SUBCUTANEOUS at 09:39

## 2021-08-07 RX ADMIN — Medication 80 MILLIGRAM(S): at 17:14

## 2021-08-07 RX ADMIN — Medication 60 MILLIGRAM(S): at 06:43

## 2021-08-07 RX ADMIN — Medication 81 MILLIGRAM(S): at 17:15

## 2021-08-07 RX ADMIN — ATORVASTATIN CALCIUM 40 MILLIGRAM(S): 80 TABLET, FILM COATED ORAL at 22:43

## 2021-08-07 RX ADMIN — HEPARIN SODIUM 700 UNIT(S)/HR: 5000 INJECTION INTRAVENOUS; SUBCUTANEOUS at 18:16

## 2021-08-07 NOTE — PROGRESS NOTE ADULT - SUBJECTIVE AND OBJECTIVE BOX
CC: no cp/sob    TELEMETRY:     PHYSICAL EXAM:    T(C): 36.6 (08-07-21 @ 06:41), Max: 36.6 (08-06-21 @ 19:50)  HR: 75 (08-07-21 @ 06:41) (73 - 79)  BP: 120/77 (08-07-21 @ 06:41) (111/61 - 134/95)  RR: 15 (08-07-21 @ 06:41) (15 - 18)  SpO2: 98% (08-07-21 @ 06:41) (98% - 100%)  Wt(kg): --  I&O's Summary    06 Aug 2021 07:01  -  07 Aug 2021 07:00  --------------------------------------------------------  IN: 600 mL / OUT: 4450 mL / NET: -3850 mL        Appearance: Normal	  Cardiovascular: Normal S1 S2,RRR, No JVD, No murmurs  Respiratory: Lungs clear to auscultation	  Gastrointestinal:  Soft, Non-tender, + BS	  Extremities: Normal range of motion, No clubbing, cyanosis or edema  Vascular: Peripheral pulses palpable 2+ bilaterally     LABS:	 	                          12.4   6.26  )-----------( 189      ( 07 Aug 2021 09:02 )             38.0     08-07    134<L>  |  96<L>  |  23  ----------------------------<  92  4.4   |  27  |  1.13    Ca    9.9      07 Aug 2021 09:02  Phos  3.3     08-07  Mg     2.10     08-07        PTT - ( 07 Aug 2021 09:02 )  PTT:>200.0 sec    CARDIAC MARKERS:      MEDICATIONS  (STANDING):  aspirin enteric coated 81 milliGRAM(s) Oral daily  atorvastatin 40 milliGRAM(s) Oral at bedtime  dextrose 40% Gel 15 Gram(s) Oral once  dextrose 5%. 1000 milliLiter(s) (50 mL/Hr) IV Continuous <Continuous>  dextrose 5%. 1000 milliLiter(s) (100 mL/Hr) IV Continuous <Continuous>  dextrose 50% Injectable 25 Gram(s) IV Push once  dextrose 50% Injectable 12.5 Gram(s) IV Push once  dextrose 50% Injectable 25 Gram(s) IV Push once  DOBUTamine Infusion 2.5 MICROgram(s)/kG/Min (5.4 mL/Hr) IV Continuous <Continuous>  furosemide   Injectable 60 milliGRAM(s) IV Push two times a day  glucagon  Injectable 1 milliGRAM(s) IntraMuscular once  heparin  Infusion. 900 Unit(s)/Hr (9 mL/Hr) IV Continuous <Continuous>  insulin lispro (ADMELOG) corrective regimen sliding scale   SubCutaneous three times a day before meals  insulin lispro (ADMELOG) corrective regimen sliding scale   SubCutaneous at bedtime  losartan 50 milliGRAM(s) Oral daily  metolazone 2.5 milliGRAM(s) Oral daily  pantoprazole    Tablet 40 milliGRAM(s) Oral before breakfast  timolol 0.5% Solution 1 Drop(s) Both EYES every 12 hours

## 2021-08-07 NOTE — PROGRESS NOTE ADULT - ASSESSMENT
Cath 6/15/2020: s/p successful ROCIO placement to the proximal RCA and RPDA.  Cath 6/16/2020: s/p successful staged PCI to the mid LAD. Medical management of LCX disease.  Echo 6/13/2020: mild Mr, fx Bioprosthetic aortic valve replacement. EF 15-20%, Moderate concentric LVH. Severe global left ventricular systolic dysfunction. mild pulm htn    a/p  71 year old with  Hypertension, Coronary Artery Disease, S/P PCI ( s/p ROCIO placement to the proximal RCA and RPDA 6/15/2020, PCI to LAD 6/16/2020), Systolic Congestive Heart Failure, Aortic Stenosis, S/P bioprosthetic AVR , Paroxysmal Atrial Fibrillation, Prostate CA presenting with fluid overload, SOB    # Acute on Chronic Systolic Congestive Heart Failure  -volume status improving however remains overloaded.   -inc lasix to 80mg 60mg IVP bid, metolazone 2.5 mg daily   -cont w dobutamine 2.5mcg/KG.   -echo with EF 14%, severe global LV dysfunction, decreased RV function, EP eval for ICD appreciated  -plan for device when euvolemic  -strict i/o   -Continue arb   -hs trop neg- no acs   -pending LE dopplers     # Hypertension  -Continue medications as prescribed.- bp much improved     #Coronary Artery Disease, S/P PCI.  -Recent Cath with Lcx disease and is s/p PCI to prox RCA, RPDA and LAD.  -Continue medical management for Lcx disease   -Stable without angina  -Continue ASA 81mg daily and plavix daily  -no acs     #. Aortic Stenosis s/p BioAVR  -echo with functioning bioprosthetic AVR.     # Paroxysmal Atrial Fibrillation  -Stable and in NSR  -hold bb on inotropes.   -ChadsVac= 4, c/w hep gtt

## 2021-08-07 NOTE — CHART NOTE - NSCHARTNOTEFT_GEN_A_CORE
Notified by Jake PERALTA pt's IV access for Heparin infusion was dislodged.     71 yy/o male, with a PmHx of HTN, pre-DM2, porcine aortic valve, Parox Afib, prostate cancer s/p prostatectomy and lymph node removal, a/w acute decompensated CHF   on Lasix 60 IV BID and started dobutamine 8/6 for inotrope assisted diuresis   + AFib- poor compliance with ac at home, now on heparin gtt awaiting ICD implantation   Patient is a hard stick, attempts were made by nursing staff to re establish IV access but unsuccessful, [employer] nurse Michele was called by nursing staff to assist with US guided IV placement. I was informed by Jake pt refused Michele CANDI to start IV access.   Patient is A & O x4 in NAD sitting comfortably in bed.   I spoke to patient regarding his reservation with Michele to start IV access, Per pt he was not going to allow Michele to start IV access as he had attempted in the past without success and his right upper arm was painful however the discomfort had improved.   Patient was advised and educated regarding importance of reinitiating IV access to restart Heparin gtt, he agreed to have IV access.   IV access reestablished by nursing staff repeat PTT = 30.3 - Heparin gtt normogram was reordered.       PTT - ( 07 Aug 2021 01:20 )  PTT:30.3 sec     Vital Signs Last 24 Hrs  T(C): 36.6 (06 Aug 2021 22:27), Max: 36.6 (06 Aug 2021 19:50)  T(F): 97.8 (06 Aug 2021 22:27), Max: 97.9 (06 Aug 2021 19:50)  HR: 75 (06 Aug 2021 22:27) (73 - 79)  BP: 120/70 (06 Aug 2021 22:27) (111/61 - 134/95)  RR: 16 (06 Aug 2021 22:27) (16 - 18)  SpO2: 99% (06 Aug 2021 22:27) (99% - 100%)

## 2021-08-08 LAB
ANION GAP SERPL CALC-SCNC: 12 MMOL/L — SIGNIFICANT CHANGE UP (ref 7–14)
APTT BLD: 63.9 SEC — HIGH (ref 27–36.3)
BUN SERPL-MCNC: 23 MG/DL — SIGNIFICANT CHANGE UP (ref 7–23)
CALCIUM SERPL-MCNC: 9.7 MG/DL — SIGNIFICANT CHANGE UP (ref 8.4–10.5)
CHLORIDE SERPL-SCNC: 95 MMOL/L — LOW (ref 98–107)
CO2 SERPL-SCNC: 28 MMOL/L — SIGNIFICANT CHANGE UP (ref 22–31)
CREAT SERPL-MCNC: 1.21 MG/DL — SIGNIFICANT CHANGE UP (ref 0.5–1.3)
GLUCOSE SERPL-MCNC: 78 MG/DL — SIGNIFICANT CHANGE UP (ref 70–99)
HCT VFR BLD CALC: 36.9 % — LOW (ref 39–50)
HGB BLD-MCNC: 12.3 G/DL — LOW (ref 13–17)
MAGNESIUM SERPL-MCNC: 2.1 MG/DL — SIGNIFICANT CHANGE UP (ref 1.6–2.6)
MCHC RBC-ENTMCNC: 30.4 PG — SIGNIFICANT CHANGE UP (ref 27–34)
MCHC RBC-ENTMCNC: 33.3 GM/DL — SIGNIFICANT CHANGE UP (ref 32–36)
MCV RBC AUTO: 91.1 FL — SIGNIFICANT CHANGE UP (ref 80–100)
NRBC # BLD: 0 /100 WBCS — SIGNIFICANT CHANGE UP
NRBC # FLD: 0 K/UL — SIGNIFICANT CHANGE UP
PHOSPHATE SERPL-MCNC: 3.6 MG/DL — SIGNIFICANT CHANGE UP (ref 2.5–4.5)
PLATELET # BLD AUTO: 183 K/UL — SIGNIFICANT CHANGE UP (ref 150–400)
POTASSIUM SERPL-MCNC: 3.9 MMOL/L — SIGNIFICANT CHANGE UP (ref 3.5–5.3)
POTASSIUM SERPL-SCNC: 3.9 MMOL/L — SIGNIFICANT CHANGE UP (ref 3.5–5.3)
RBC # BLD: 4.05 M/UL — LOW (ref 4.2–5.8)
RBC # FLD: 13.4 % — SIGNIFICANT CHANGE UP (ref 10.3–14.5)
SODIUM SERPL-SCNC: 135 MMOL/L — SIGNIFICANT CHANGE UP (ref 135–145)
WBC # BLD: 6.58 K/UL — SIGNIFICANT CHANGE UP (ref 3.8–10.5)
WBC # FLD AUTO: 6.58 K/UL — SIGNIFICANT CHANGE UP (ref 3.8–10.5)

## 2021-08-08 PROCEDURE — 99232 SBSQ HOSP IP/OBS MODERATE 35: CPT

## 2021-08-08 RX ORDER — HEPARIN SODIUM 5000 [USP'U]/ML
3000 INJECTION INTRAVENOUS; SUBCUTANEOUS EVERY 6 HOURS
Refills: 0 | Status: DISCONTINUED | OUTPATIENT
Start: 2021-08-08 | End: 2021-08-09

## 2021-08-08 RX ORDER — RIVAROXABAN 15 MG-20MG
20 KIT ORAL ONCE
Refills: 0 | Status: DISCONTINUED | OUTPATIENT
Start: 2021-08-08 | End: 2021-08-08

## 2021-08-08 RX ORDER — HEPARIN SODIUM 5000 [USP'U]/ML
6000 INJECTION INTRAVENOUS; SUBCUTANEOUS EVERY 6 HOURS
Refills: 0 | Status: DISCONTINUED | OUTPATIENT
Start: 2021-08-08 | End: 2021-08-09

## 2021-08-08 RX ORDER — HEPARIN SODIUM 5000 [USP'U]/ML
700 INJECTION INTRAVENOUS; SUBCUTANEOUS
Qty: 25000 | Refills: 0 | Status: DISCONTINUED | OUTPATIENT
Start: 2021-08-08 | End: 2021-08-09

## 2021-08-08 RX ADMIN — PANTOPRAZOLE SODIUM 40 MILLIGRAM(S): 20 TABLET, DELAYED RELEASE ORAL at 06:45

## 2021-08-08 RX ADMIN — Medication 81 MILLIGRAM(S): at 17:45

## 2021-08-08 RX ADMIN — Medication 80 MILLIGRAM(S): at 17:44

## 2021-08-08 RX ADMIN — ATORVASTATIN CALCIUM 40 MILLIGRAM(S): 80 TABLET, FILM COATED ORAL at 22:07

## 2021-08-08 RX ADMIN — Medication 1 DROP(S): at 17:46

## 2021-08-08 RX ADMIN — Medication 5.4 MICROGRAM(S)/KG/MIN: at 17:47

## 2021-08-08 RX ADMIN — LOSARTAN POTASSIUM 50 MILLIGRAM(S): 100 TABLET, FILM COATED ORAL at 06:45

## 2021-08-08 RX ADMIN — Medication 80 MILLIGRAM(S): at 06:42

## 2021-08-08 RX ADMIN — Medication 1 DROP(S): at 06:42

## 2021-08-08 RX ADMIN — HEPARIN SODIUM 700 UNIT(S)/HR: 5000 INJECTION INTRAVENOUS; SUBCUTANEOUS at 07:43

## 2021-08-08 NOTE — PROGRESS NOTE ADULT - SUBJECTIVE AND OBJECTIVE BOX
Patient is a 71y old  Male who presents with a chief complaint of sob HF exacerbation (08 Aug 2021 10:34)    Date of servie : 08-08-21 @ 18:04  INTERVAL HPI/OVERNIGHT EVENTS:  T(C): 36.6 (08-08-21 @ 17:37), Max: 36.7 (08-07-21 @ 22:41)  HR: 74 (08-08-21 @ 17:37) (73 - 74)  BP: 110/59 (08-08-21 @ 17:37) (110/59 - 118/67)  RR: 16 (08-08-21 @ 17:37) (15 - 18)  SpO2: 96% (08-08-21 @ 17:37) (96% - 99%)  Wt(kg): --  I&O's Summary    07 Aug 2021 07:01  -  08 Aug 2021 07:00  --------------------------------------------------------  IN: 1010 mL / OUT: 3750 mL / NET: -2740 mL    08 Aug 2021 07:01  -  08 Aug 2021 18:04  --------------------------------------------------------  IN: 268 mL / OUT: 1600 mL / NET: -1332 mL        LABS:                        12.3   6.58  )-----------( 183      ( 08 Aug 2021 06:55 )             36.9     08-08    135  |  95<L>  |  23  ----------------------------<  78  3.9   |  28  |  1.21    Ca    9.7      08 Aug 2021 06:55  Phos  3.6     08-08  Mg     2.10     08-08      PTT - ( 08 Aug 2021 06:55 )  PTT:63.9 sec    CAPILLARY BLOOD GLUCOSE      POCT Blood Glucose.: 99 mg/dL (08 Aug 2021 17:54)  POCT Blood Glucose.: 122 mg/dL (08 Aug 2021 12:42)  POCT Blood Glucose.: 83 mg/dL (08 Aug 2021 08:47)  POCT Blood Glucose.: 122 mg/dL (07 Aug 2021 22:23)            MEDICATIONS  (STANDING):  aspirin enteric coated 81 milliGRAM(s) Oral daily  atorvastatin 40 milliGRAM(s) Oral at bedtime  dextrose 40% Gel 15 Gram(s) Oral once  dextrose 5%. 1000 milliLiter(s) (50 mL/Hr) IV Continuous <Continuous>  dextrose 5%. 1000 milliLiter(s) (100 mL/Hr) IV Continuous <Continuous>  dextrose 50% Injectable 25 Gram(s) IV Push once  dextrose 50% Injectable 12.5 Gram(s) IV Push once  dextrose 50% Injectable 25 Gram(s) IV Push once  DOBUTamine Infusion 2.5 MICROgram(s)/kG/Min (5.4 mL/Hr) IV Continuous <Continuous>  furosemide   Injectable 80 milliGRAM(s) IV Push every 12 hours  glucagon  Injectable 1 milliGRAM(s) IntraMuscular once  heparin  Infusion. 700 Unit(s)/Hr (7 mL/Hr) IV Continuous <Continuous>  insulin lispro (ADMELOG) corrective regimen sliding scale   SubCutaneous three times a day before meals  insulin lispro (ADMELOG) corrective regimen sliding scale   SubCutaneous at bedtime  losartan 50 milliGRAM(s) Oral daily  metolazone 2.5 milliGRAM(s) Oral daily  pantoprazole    Tablet 40 milliGRAM(s) Oral before breakfast  timolol 0.5% Solution 1 Drop(s) Both EYES every 12 hours    MEDICATIONS  (PRN):  acetaminophen   Tablet .. 650 milliGRAM(s) Oral every 6 hours PRN Temp greater or equal to 38C (100.4F), Mild Pain (1 - 3), Moderate Pain (4 - 6)  heparin   Injectable 6000 Unit(s) IV Push every 6 hours PRN For aPTT less than 40  heparin   Injectable 3000 Unit(s) IV Push every 6 hours PRN For aPTT between 40 - 57          PHYSICAL EXAM:  GENERAL: NAD, well-groomed, well-developed  HEAD:  Atraumatic, Normocephalic  CHEST/LUNG: Clear to percussion bilaterally; No rales, rhonchi, wheezing, or rubs  HEART: Regular rate and rhythm; No murmurs, rubs, or gallops  ABDOMEN: Soft, Nontender, Nondistended; Bowel sounds present  EXTREMITIES:  2+ Peripheral Pulses, No clubbing, cyanosis, or edema  LYMPH: No lymphadenopathy noted  SKIN: No rashes or lesions    Care Discussed with Consultants/Other Providers [ ] YES  [ ] NO

## 2021-08-08 NOTE — PROVIDER CONTACT NOTE (OTHER) - ASSESSMENT
VS stable as per flowsheet. Previous PTT within range. NO signs or symptoms of bleeding
VS stable as per flowsheet. Patient refusing IV access to be re-attempted and labs to be drawn for heparin gtt. RN, management attempted mutiple times & patient hard stick. CANDI at bedside, patient refusing CANDI to draw labs
VS stable as per flowsheet. Previous APTT within range. NO signs or symptoms of bleeding. APTT currently 8/8/21 @0655 is 63.9
VS stable as per flowsheet. Previous PTT within range. NO signs or symptoms of bleeding
Patient is A&Ox4, Patient denies pain, chest pain, shortness of breath, nausea, vomiting or dizziness. RN attempted to draw patient's blood twice with no success and PCA attempted once with no success.
VS stable as per flowsheet. Patient refusing IV access to be re-attempted and labs to be drawn for heparin gtt. RN, management attempted multiple times & patient hard stick. CANDI at bedside, patient refusing CANDI to draw labs. Patient now has access and labs drawn

## 2021-08-08 NOTE — PROVIDER CONTACT NOTE (OTHER) - ACTION/TREATMENT ORDERED:
As per ALONZO Greenwood, continue to re-educate and provider will come to bedside to speak with patient
As per provider Tank Paul (#[id number]), will come to the bedside and attempt blood draw.
As per ALONZO Mclean, re-attempt and continue to educate patient. When new IV access and lab drawn, will re-order heparin gtt
As per ALONZO Greenwood, heparin gtt discontinued and xarelto ordered
As per ALONZO Mclean, will re-order heparin gtt and give 6000 unit bolus
As per PA Anaar, heparin gtt theapeutic and will only require daily ptt's. As per PA, will discontinue PO xarelto and reinstate heparin gtt @ 7mL/hr

## 2021-08-08 NOTE — PROVIDER CONTACT NOTE (OTHER) - SITUATION
Patient allowing labs to be drawn including APTT, which came back therapeutic
Patient now allowing IV Access and ptt drawn
Patient refusing to have lab draw for ptt despite continuous education
Unable to draw patient's blood for APTT
Patient refusing IV access for PTT to be drawn, since due at 1925 and ripped heparin gtt IV out. Heparin gtt off for 4 hours, ALONZO Lagos notified
Patient refusing lab draw including aptt

## 2021-08-08 NOTE — PROGRESS NOTE ADULT - ASSESSMENT
Cath 6/15/2020: s/p successful ROCIO placement to the proximal RCA and RPDA.  Cath 2020: s/p successful staged PCI to the mid LAD. Medical management of LCX disease.  Echo 2020: mild Mr, fx Bioprosthetic aortic valve replacement. EF 15-20%, Moderate concentric LVH. Severe global left ventricular systolic dysfunction. mild pulm htn    a/p  71 year old with  Hypertension, Coronary Artery Disease, S/P PCI ( s/p ROCIO placement to the proximal RCA and RPDA 6/15/2020, PCI to LAD 2020), Systolic Congestive Heart Failure, Aortic Stenosis, S/P bioprosthetic AVR , Paroxysmal Atrial Fibrillation, Prostate CA presenting with fluid overload, SOB    # Acute on Chronic Systolic Congestive Heart Failure  -volume status improving however remains overloaded.   -cont lasix  80mg IVP bid, metolazone 2.5 mg daily   -cont w dobutamine 2.5mcg/KG.   -plan to wean off  next 24 hrs  -echo with EF 14%, severe global LV dysfunction, decreased RV function, EP eval for ICD appreciated  -plan for device when euvolemic  -strict i/o   -Continue arb   -hs trop neg- no acs   -pending LE dopplers     # Hypertension  -Continue medications as prescribed.- bp much improved     #Coronary Artery Disease, S/P PCI.  -Recent Cath with Lcx disease and is s/p PCI to prox RCA, RPDA and LAD.  -Continue medical management for Lcx disease   -Stable without angina  -Continue ASA 81mg daily and plavix daily  -no acs     #. Aortic Stenosis s/p BioAVR  -echo with functioning bioprosthetic AVR.     # Paroxysmal Atrial Fibrillation  -Stable and in NSR  -hold bb on inotropes.   -ChadsVac= 4, c/w hep gtt

## 2021-08-08 NOTE — PROGRESS NOTE ADULT - SUBJECTIVE AND OBJECTIVE BOX
Overnight Events:   O[employer]right patient refusing     Current Meds:  acetaminophen   Tablet .. 650 milliGRAM(s) Oral every 6 hours PRN  aspirin enteric coated 81 milliGRAM(s) Oral daily  atorvastatin 40 milliGRAM(s) Oral at bedtime  dextrose 40% Gel 15 Gram(s) Oral once  dextrose 5%. 1000 milliLiter(s) IV Continuous <Continuous>  dextrose 5%. 1000 milliLiter(s) IV Continuous <Continuous>  dextrose 50% Injectable 25 Gram(s) IV Push once  dextrose 50% Injectable 12.5 Gram(s) IV Push once  dextrose 50% Injectable 25 Gram(s) IV Push once  DOBUTamine Infusion 2.5 MICROgram(s)/kG/Min IV Continuous <Continuous>  furosemide   Injectable 80 milliGRAM(s) IV Push every 12 hours  glucagon  Injectable 1 milliGRAM(s) IntraMuscular once  heparin   Injectable 6000 Unit(s) IV Push every 6 hours PRN  heparin   Injectable 3000 Unit(s) IV Push every 6 hours PRN  heparin  Infusion. 700 Unit(s)/Hr IV Continuous <Continuous>  insulin lispro (ADMELOG) corrective regimen sliding scale   SubCutaneous three times a day before meals  insulin lispro (ADMELOG) corrective regimen sliding scale   SubCutaneous at bedtime  losartan 50 milliGRAM(s) Oral daily  metolazone 2.5 milliGRAM(s) Oral daily  pantoprazole    Tablet 40 milliGRAM(s) Oral before breakfast  timolol 0.5% Solution 1 Drop(s) Both EYES every 12 hours        Vitals:  ICU Vital Signs Last 24 Hrs  T(C): 36.7 (08 Aug 2021 06:41), Max: 36.7 (07 Aug 2021 22:41)  T(F): 98.1 (08 Aug 2021 06:41), Max: 98.1 (08 Aug 2021 06:41)  HR: 74 (08 Aug 2021 06:41) (73 - 74)  BP: 118/63 (08 Aug 2021 06:41) (110/60 - 128/82)  BP(mean): --  ABP: --  ABP(mean): --  RR: 15 (08 Aug 2021 06:41) (15 - 18)  SpO2: 98% (08 Aug 2021 06:41) (97% - 99%)      I&O's Summary    07 Aug 2021 07:01  -  08 Aug 2021 07:00  --------------------------------------------------------  IN: 1010 mL / OUT: 3750 mL / NET: -2740 mL    08 Aug 2021 07:01  -  08 Aug 2021 09:44  --------------------------------------------------------  IN: 0 mL / OUT: 300 mL / NET: -300 mL            Physical Exam:  Appearance: No acute distress; well appearing  Eyes: PERRL, EOMI, pink conjunctiva  HENT: Normal oral mucosa  Cardiovascular: RRR, S1, S2, no murmurs, rubs, or gallops; no edema; no JVD  Respiratory: Clear to auscultation bilaterally  Gastrointestinal: soft, non-tender, non-distended with normal bowel sounds  Musculoskeletal: No clubbing; no joint deformity   Neurologic: Non-focal  Lymphatic: No lymphadenopathy  Psychiatry: AAOx3, mood & affect appropriate  Skin: No rashes, ecchymoses, or cyanosis                          12.3   6.58  )-----------( 183      ( 08 Aug 2021 06:55 )             36.9     08-08    135  |  95<L>  |  23  ----------------------------<  78  3.9   |  28  |  1.21    Ca    9.7      08 Aug 2021 06:55  Phos  3.6     08-08  Mg     2.10     08-08      PTT - ( 08 Aug 2021 06:55 )  PTT:63.9 sec      Serum Pro-Brain Natriuretic Peptide: 4843 pg/mL (08-05 @ 07:59)  Serum Pro-Brain Natriuretic Peptide: 3902 pg/mL (08-03 @ 20:58)          New ECG(s): Personally reviewed    Echo:    Stress Testing:     Cath:    Imaging:    Interpretation of Telemetry:   Overnight Events:   Overnight patient refusing lab draws  Discussed procedure with patient today and willing to have lab draws and procedure.     Current Meds:  acetaminophen   Tablet .. 650 milliGRAM(s) Oral every 6 hours PRN  aspirin enteric coated 81 milliGRAM(s) Oral daily  atorvastatin 40 milliGRAM(s) Oral at bedtime  dextrose 40% Gel 15 Gram(s) Oral once  dextrose 5%. 1000 milliLiter(s) IV Continuous <Continuous>  dextrose 5%. 1000 milliLiter(s) IV Continuous <Continuous>  dextrose 50% Injectable 25 Gram(s) IV Push once  dextrose 50% Injectable 12.5 Gram(s) IV Push once  dextrose 50% Injectable 25 Gram(s) IV Push once  DOBUTamine Infusion 2.5 MICROgram(s)/kG/Min IV Continuous <Continuous>  furosemide   Injectable 80 milliGRAM(s) IV Push every 12 hours  glucagon  Injectable 1 milliGRAM(s) IntraMuscular once  heparin   Injectable 6000 Unit(s) IV Push every 6 hours PRN  heparin   Injectable 3000 Unit(s) IV Push every 6 hours PRN  heparin  Infusion. 700 Unit(s)/Hr IV Continuous <Continuous>  insulin lispro (ADMELOG) corrective regimen sliding scale   SubCutaneous three times a day before meals  insulin lispro (ADMELOG) corrective regimen sliding scale   SubCutaneous at bedtime  losartan 50 milliGRAM(s) Oral daily  metolazone 2.5 milliGRAM(s) Oral daily  pantoprazole    Tablet 40 milliGRAM(s) Oral before breakfast  timolol 0.5% Solution 1 Drop(s) Both EYES every 12 hours        Vitals:  ICU Vital Signs Last 24 Hrs  T(C): 36.7 (08 Aug 2021 06:41), Max: 36.7 (07 Aug 2021 22:41)  T(F): 98.1 (08 Aug 2021 06:41), Max: 98.1 (08 Aug 2021 06:41)  HR: 74 (08 Aug 2021 06:41) (73 - 74)  BP: 118/63 (08 Aug 2021 06:41) (110/60 - 128/82)  BP(mean): --  ABP: --  ABP(mean): --  RR: 15 (08 Aug 2021 06:41) (15 - 18)  SpO2: 98% (08 Aug 2021 06:41) (97% - 99%)      I&O's Summary    07 Aug 2021 07:01  -  08 Aug 2021 07:00  --------------------------------------------------------  IN: 1010 mL / OUT: 3750 mL / NET: -2740 mL    08 Aug 2021 07:01  -  08 Aug 2021 09:44  --------------------------------------------------------  IN: 0 mL / OUT: 300 mL / NET: -300 mL            Physical Exam:  Appearance: No acute distress; well appearing  Eyes: PERRL, EOMI, pink conjunctiva  HENT: Normal oral mucosa  Cardiovascular: RRR, S1, S2, no murmurs, rubs, or gallops; no edema; no JVD  Respiratory: Clear to auscultation bilaterally  Gastrointestinal: soft, non-tender, non-distended with normal bowel sounds  Musculoskeletal: No clubbing; no joint deformity   Neurologic: Non-focal  Lymphatic: No lymphadenopathy  Psychiatry: AAOx3, mood & affect appropriate  Skin: No rashes, ecchymoses, or cyanosis                          12.3   6.58  )-----------( 183      ( 08 Aug 2021 06:55 )             36.9     08-08    135  |  95<L>  |  23  ----------------------------<  78  3.9   |  28  |  1.21    Ca    9.7      08 Aug 2021 06:55  Phos  3.6     08-08  Mg     2.10     08-08      PTT - ( 08 Aug 2021 06:55 )  PTT:63.9 sec      Serum Pro-Brain Natriuretic Peptide: 4843 pg/mL (08-05 @ 07:59)  Serum Pro-Brain Natriuretic Peptide: 3902 pg/mL (08-03 @ 20:58)          New ECG(s): Personally reviewed    Echo:    Stress Testing:     Cath:    Imaging:    Interpretation of Telemetry:

## 2021-08-08 NOTE — PROGRESS NOTE ADULT - SUBJECTIVE AND OBJECTIVE BOX
CC: feeling better w increase in lasix dosing, net negative > 2 liters    TELEMETRY:     PHYSICAL EXAM:    T(C): 36.7 (08-08-21 @ 06:41), Max: 36.7 (08-07-21 @ 22:41)  HR: 74 (08-08-21 @ 06:41) (73 - 74)  BP: 118/63 (08-08-21 @ 06:41) (110/60 - 128/82)  RR: 15 (08-08-21 @ 06:41) (15 - 18)  SpO2: 98% (08-08-21 @ 06:41) (97% - 99%)  Wt(kg): --  I&O's Summary    07 Aug 2021 07:01  -  08 Aug 2021 07:00  --------------------------------------------------------  IN: 1010 mL / OUT: 3750 mL / NET: -2740 mL    08 Aug 2021 07:01  -  08 Aug 2021 10:34  --------------------------------------------------------  IN: 0 mL / OUT: 300 mL / NET: -300 mL        Appearance: Normal	  Cardiovascular: Normal S1 S2,RRR, No JVD, No murmurs  Respiratory: Lungs clear to auscultation	  Gastrointestinal:  Soft, Non-tender, + BS	  Extremities: Normal range of motion, No clubbing, cyanosis or edema  Vascular: Peripheral pulses palpable 2+ bilaterally     LABS:	 	                          12.3   6.58  )-----------( 183      ( 08 Aug 2021 06:55 )             36.9     08-08    135  |  95<L>  |  23  ----------------------------<  78  3.9   |  28  |  1.21    Ca    9.7      08 Aug 2021 06:55  Phos  3.6     08-08  Mg     2.10     08-08        PTT - ( 08 Aug 2021 06:55 )  PTT:63.9 sec    CARDIAC MARKERS:

## 2021-08-08 NOTE — PROVIDER CONTACT NOTE (OTHER) - RECOMMENDATIONS
As per ALONZO Greenwood, heparin gtt discontinued and xarelto ordered
As per PA Anaar, heparin gtt theapeutic and will only require daily ptt's as of now. As per PA, will discontinue PO xarelto and reinstate heparin gtt @ 7mL/hr
As per provider Tank Paul (#[id number]), will come to the bedside and attempt blood draw.
As per ALONZO Greenwood, continue to re-educate and provider will come to bedside to speak with patient
As per ALONZO Mclean, re-attempt and continue to educate patient. When new IV access and lab drawn, will re-order heparin gtt
As per ALONZO Mclean, will re-order heparin gtt and give 6000 unit bolus

## 2021-08-08 NOTE — PROGRESS NOTE ADULT - ASSESSMENT
71M with CAD s/p ROCIO pRCA and RPDA 6/15/2020, LAD 6/16/2020, HFrEF, AS s/p bioAVR 12/2010, pAF on Xarelto, HTN, preDM, prostate cancer s/p prostatectomy and LND, who presented at Dr. Velázquez's office for SOB x 2 weeks. EP consulted for ICD eval.     Acute on chronic HFrEF: severe global LV dysfunction with moderate concentric hypertrophy, echo has characteristics of infiltrative CM. Past echo 1 year ago also with severe LV dysfunction, and patient had been on GDMT (however possibly not on metoprolol for the past month due to possible side effects)  - Patient volume status improved.    - Plan for ICD tomorrow.   - NPO after midnight.   - amyloid workup per general cardiology  - patient on losartan 50, Toprol 25 QD, lasix 80 IV BID with metolazone    pAF: [id number] (CHF, HTN, age, vasc). Patient currently in NSR.   - please transition from heparin to Xarelto   - on Toprol 25 QD     CAD s/p ROCIO pRCA and RPDA 6/15/2020, LAD 6/16/2020:  - on ASA, plavix, Lipitor 40  - would consider discontinuation of ASA, plavix to minimize triple therapy as it's been > 1 year since his last stent, however will defer to Dr. Velázquez   71M with CAD s/p ROCIO pRCA and RPDA 6/15/2020, LAD 6/16/2020, HFrEF, AS s/p bioAVR 12/2010, pAF on Xarelto, HTN, preDM, prostate cancer s/p prostatectomy and LND, who presented at Dr. Velázquez's office for SOB x 2 weeks. EP consulted for ICD eval.     Acute on chronic HFrEF: severe global LV dysfunction with moderate concentric hypertrophy, echo has characteristics of infiltrative CM. Past echo 1 year ago also with severe LV dysfunction, and patient had been on GDMT (however possibly not on metoprolol for the past month due to possible side effects)  - Patient volume status improved.   - Plan for ICD tomorrow.   - Hold AC  - NPO after midnight.   - amyloid workup per general cardiology  - patient on losartan 50, Toprol 25 QD, lasix 80 IV BID with metolazone    pAF: [id number] (CHF, HTN, age, vasc). Patient currently in NSR.   - Start Xarelto post ICD implant  - on Toprol 25 QD     CAD s/p ROCIO pRCA and RPDA 6/15/2020, LAD 6/16/2020:  - on ASA, plavix, Lipitor 40  - would consider discontinuation of ASA or plavix to minimize triple therapy once AC started as it's been > 1 year since his last stent, however will defer to Dr. Velázquez

## 2021-08-08 NOTE — PROVIDER CONTACT NOTE (OTHER) - REASON
Patient allowing labs to be drawn including APTT, which came back therapeutic
Patient refusing IV access for PTT to be drawn, since due at 1925 and ripped heparin gtt IV out.
Unable to draw patient's blood for APTT
Patient now allowing IV Access and ptt drawn
Patient refusing lab draw including aptt
Patient refusing to have lab draw for ptt despite continuous education

## 2021-08-08 NOTE — PROGRESS NOTE ADULT - ASSESSMENT
Problem/Plan - 1:  ·  Problem: Acute on chronic diastolic congestive heart failure.  Plan: -patient with diastolic CHF, unsure of his baseline EF  -CAD s/p 4 stents  -last stent 1 year ago  -c/w asp/plavix started lipitor.   - diuresis as per cards     Problem/Plan - 2:  ·  Problem: AF (Paroxysmal Atrial Fibrillation).  Plan: - telemonitor  hep gtt  Problem/Plan - 3:  ·  Problem: Other ascites.  Plan: -moderate ascites likely 2/2 CHF  -diuresis as above    Problem/Plan - 4:  ·  Problem: Essential hypertension.  Plan: -c/w losartan 100 mg.     Problem/Plan - 5:  ·  Problem: Chronic gastric ulcer, unspecified whether gastric ulcer hemorrhage or perforation present.  Plan: -no epigastric tenderness  -started on protonix daily.

## 2021-08-08 NOTE — PROVIDER CONTACT NOTE (OTHER) - DATE AND TIME:
07-Aug-2021 00:40
06-Aug-2021 20:15
08-Aug-2021 00:00
08-Aug-2021 07:40
04-Aug-2021 11:40
08-Aug-2021 06:00

## 2021-08-08 NOTE — CHART NOTE - NSCHARTNOTEFT_GEN_A_CORE
70 y/o male with a hx of afib and CHF currently on dobutamine, lasix IV q12, and heparin gtt. Pt scheduled for ICD placement tomorrow and is refusing PTT check. Last PTT was therapeutic at 64.4. Patient states that he understands the risk of internal bleeding that could lead to shock and including death. Patient reports that he has been stuck multiple times and his arms and hands are sore from the constant blood draws. Re-iterated the importance of PTT monitoring while on heparin gtt, however patient adamantly refusing labs. Pt is A&O x 4. Discuss with patient the signs and symptoms of internal and external bleeding/hemorrhage, pt expressed understanding. Also instructed patient to let us know if he experiences and signs or symptoms of internal and external bleeding/hemorrhage. Will cont heparin gtt at this time at current rate. Will re-attempt discussing allowing lab draws for PTT monitoring while in heparin gtt. Will cont to monitor.

## 2021-08-08 NOTE — PROVIDER CONTACT NOTE (OTHER) - BACKGROUND
Patient admitted with heart failure. Hx of paroxmal a-fib, HTN, DM2, and prostate cancer
Patient admitted with heart failure. Hx of paroxmal a-fib, HTN, DM2, and prostate cancer
71 year old male admitted for heart failure
Patient admitted with heart failure. Hx of paroxmal a-fib, HTN, DM2, and prostate cancer
Patient admitted with heart failure. Hx of paroxymal a-fib, HTN, DM2, and prostate cancer
Patient admitted with heart failure. Hx of paroxmal a-fib, HTN, DM2, and prostate cancer

## 2021-08-09 LAB
ANION GAP SERPL CALC-SCNC: 14 MMOL/L — SIGNIFICANT CHANGE UP (ref 7–14)
APTT BLD: 66.1 SEC — HIGH (ref 27–36.3)
BUN SERPL-MCNC: 30 MG/DL — HIGH (ref 7–23)
CALCIUM SERPL-MCNC: 9.8 MG/DL — SIGNIFICANT CHANGE UP (ref 8.4–10.5)
CHLORIDE SERPL-SCNC: 92 MMOL/L — LOW (ref 98–107)
CO2 SERPL-SCNC: 29 MMOL/L — SIGNIFICANT CHANGE UP (ref 22–31)
CREAT SERPL-MCNC: 1.38 MG/DL — HIGH (ref 0.5–1.3)
GLUCOSE SERPL-MCNC: 85 MG/DL — SIGNIFICANT CHANGE UP (ref 70–99)
HCT VFR BLD CALC: 37.5 % — LOW (ref 39–50)
HGB BLD-MCNC: 12.6 G/DL — LOW (ref 13–17)
MAGNESIUM SERPL-MCNC: 2.1 MG/DL — SIGNIFICANT CHANGE UP (ref 1.6–2.6)
MCHC RBC-ENTMCNC: 30.5 PG — SIGNIFICANT CHANGE UP (ref 27–34)
MCHC RBC-ENTMCNC: 33.6 GM/DL — SIGNIFICANT CHANGE UP (ref 32–36)
MCV RBC AUTO: 90.8 FL — SIGNIFICANT CHANGE UP (ref 80–100)
NRBC # BLD: 0 /100 WBCS — SIGNIFICANT CHANGE UP
NRBC # FLD: 0 K/UL — SIGNIFICANT CHANGE UP
PHOSPHATE SERPL-MCNC: 3.7 MG/DL — SIGNIFICANT CHANGE UP (ref 2.5–4.5)
PLATELET # BLD AUTO: 189 K/UL — SIGNIFICANT CHANGE UP (ref 150–400)
POTASSIUM SERPL-MCNC: 3.9 MMOL/L — SIGNIFICANT CHANGE UP (ref 3.5–5.3)
POTASSIUM SERPL-SCNC: 3.9 MMOL/L — SIGNIFICANT CHANGE UP (ref 3.5–5.3)
RBC # BLD: 4.13 M/UL — LOW (ref 4.2–5.8)
RBC # FLD: 13.4 % — SIGNIFICANT CHANGE UP (ref 10.3–14.5)
SODIUM SERPL-SCNC: 135 MMOL/L — SIGNIFICANT CHANGE UP (ref 135–145)
WBC # BLD: 6.51 K/UL — SIGNIFICANT CHANGE UP (ref 3.8–10.5)
WBC # FLD AUTO: 6.51 K/UL — SIGNIFICANT CHANGE UP (ref 3.8–10.5)

## 2021-08-09 RX ORDER — POTASSIUM CHLORIDE 20 MEQ
40 PACKET (EA) ORAL ONCE
Refills: 0 | Status: COMPLETED | OUTPATIENT
Start: 2021-08-09 | End: 2021-08-09

## 2021-08-09 RX ORDER — FUROSEMIDE 40 MG
40 TABLET ORAL
Refills: 0 | Status: DISCONTINUED | OUTPATIENT
Start: 2021-08-10 | End: 2021-08-12

## 2021-08-09 RX ADMIN — Medication 80 MILLIGRAM(S): at 06:37

## 2021-08-09 RX ADMIN — LOSARTAN POTASSIUM 50 MILLIGRAM(S): 100 TABLET, FILM COATED ORAL at 06:37

## 2021-08-09 RX ADMIN — Medication 81 MILLIGRAM(S): at 18:21

## 2021-08-09 RX ADMIN — Medication 40 MILLIEQUIVALENT(S): at 13:13

## 2021-08-09 RX ADMIN — Medication 1 DROP(S): at 06:37

## 2021-08-09 RX ADMIN — PANTOPRAZOLE SODIUM 40 MILLIGRAM(S): 20 TABLET, DELAYED RELEASE ORAL at 06:37

## 2021-08-09 RX ADMIN — ATORVASTATIN CALCIUM 40 MILLIGRAM(S): 80 TABLET, FILM COATED ORAL at 21:59

## 2021-08-09 RX ADMIN — Medication 1 DROP(S): at 18:21

## 2021-08-09 NOTE — PROGRESS NOTE ADULT - SUBJECTIVE AND OBJECTIVE BOX
Patient is a 71y old  Male who presents with a chief complaint of sob HF exacerbation (09 Aug 2021 11:43)    Date of servie : 08-09-21 @ 15:23  INTERVAL HPI/OVERNIGHT EVENTS:  T(C): 36.9 (08-09-21 @ 06:36), Max: 36.9 (08-09-21 @ 06:36)  HR: 77 (08-09-21 @ 06:36) (73 - 77)  BP: 118/71 (08-09-21 @ 06:36) (100/60 - 118/71)  RR: 15 (08-09-21 @ 06:36) (15 - 16)  SpO2: 98% (08-09-21 @ 06:36) (96% - 98%)  Wt(kg): --  I&O's Summary    08 Aug 2021 07:01  -  09 Aug 2021 07:00  --------------------------------------------------------  IN: 618 mL / OUT: 3275 mL / NET: -2657 mL    09 Aug 2021 07:01  -  09 Aug 2021 15:23  --------------------------------------------------------  IN: 0 mL / OUT: 1475 mL / NET: -1475 mL        LABS:                        12.6   6.51  )-----------( 189      ( 09 Aug 2021 07:19 )             37.5     08-09    135  |  92<L>  |  30<H>  ----------------------------<  85  3.9   |  29  |  1.38<H>    Ca    9.8      09 Aug 2021 07:19  Phos  3.7     08-09  Mg     2.10     08-09      PTT - ( 09 Aug 2021 07:19 )  PTT:66.1 sec    CAPILLARY BLOOD GLUCOSE      POCT Blood Glucose.: 90 mg/dL (09 Aug 2021 12:17)  POCT Blood Glucose.: 88 mg/dL (09 Aug 2021 08:24)  POCT Blood Glucose.: 90 mg/dL (09 Aug 2021 04:19)  POCT Blood Glucose.: 129 mg/dL (08 Aug 2021 22:14)  POCT Blood Glucose.: 99 mg/dL (08 Aug 2021 17:54)            MEDICATIONS  (STANDING):  aspirin enteric coated 81 milliGRAM(s) Oral daily  atorvastatin 40 milliGRAM(s) Oral at bedtime  dextrose 40% Gel 15 Gram(s) Oral once  dextrose 5%. 1000 milliLiter(s) (50 mL/Hr) IV Continuous <Continuous>  dextrose 5%. 1000 milliLiter(s) (100 mL/Hr) IV Continuous <Continuous>  dextrose 50% Injectable 25 Gram(s) IV Push once  dextrose 50% Injectable 12.5 Gram(s) IV Push once  dextrose 50% Injectable 25 Gram(s) IV Push once  glucagon  Injectable 1 milliGRAM(s) IntraMuscular once  insulin lispro (ADMELOG) corrective regimen sliding scale   SubCutaneous three times a day before meals  insulin lispro (ADMELOG) corrective regimen sliding scale   SubCutaneous at bedtime  losartan 50 milliGRAM(s) Oral daily  metolazone 2.5 milliGRAM(s) Oral daily  pantoprazole    Tablet 40 milliGRAM(s) Oral before breakfast  timolol 0.5% Solution 1 Drop(s) Both EYES every 12 hours    MEDICATIONS  (PRN):  acetaminophen   Tablet .. 650 milliGRAM(s) Oral every 6 hours PRN Temp greater or equal to 38C (100.4F), Mild Pain (1 - 3), Moderate Pain (4 - 6)          PHYSICAL EXAM:  GENERAL: NAD, well-groomed, well-developed  HEAD:  Atraumatic, Normocephalic  CHEST/LUNG: Clear to percussion bilaterally; No rales, rhonchi, wheezing, or rubs  HEART: Regular rate and rhythm; No murmurs, rubs, or gallops  ABDOMEN: Soft, Nontender, Nondistended; Bowel sounds present  EXTREMITIES:  2+ Peripheral Pulses, No clubbing, cyanosis, or edema  LYMPH: No lymphadenopathy noted  SKIN: No rashes or lesions    Care Discussed with Consultants/Other Providers [ ] YES  [ ] NO

## 2021-08-09 NOTE — PROGRESS NOTE ADULT - ASSESSMENT
Problem/Plan - 1:  ·  Problem: Acute on chronic diastolic congestive heart failure.  Plan: -patient with diastolic CHF, unsure of his baseline EF  -CAD s/p 4 stents  -last stent 1 year ago  -c/w asp/plavix started lipitor.   - diuresis as per cards     Problem/Plan - 2:  ·  Problem: AF (Paroxysmal Atrial Fibrillation).  Plan: - telemonitor  AC as per cards     Problem/Plan - 3:  ·  Problem: Other ascites.  Plan: -moderate ascites likely 2/2 CHF  -diuresis as above    Problem/Plan - 4:  ·  Problem: Essential hypertension.  Plan: -c/w losartan 100 mg.     Problem/Plan - 5:  ·  Problem: Chronic gastric ulcer, unspecified whether gastric ulcer hemorrhage or perforation present.  Plan: -no epigastric tenderness  -started on protonix daily.

## 2021-08-09 NOTE — PROGRESS NOTE ADULT - SUBJECTIVE AND OBJECTIVE BOX
CARDIOLOGY FOLLOW UP - Dr. Velázquez  Date of Service: 8/9/21  CC: feeling better, less dyspnea, le edema improved, abd distention improving     Review of Systems:  Constitutional: No fever, weight loss, or fatigue  Respiratory: No cough, wheezing, or hemoptysis, no shortness of breath  Cardiovascular: No chest pain, palpitations, passing out, dizziness, or leg swelling  Gastrointestinal: No abd or epigastric pain.  No nausea, vomiting, or hematemesis; no diarrhea or constipation, no melena or hematochezia  Vascular: no edema       PHYSICAL EXAM:  T(C): 36.9 (08-09-21 @ 06:36), Max: 36.9 (08-09-21 @ 06:36)  HR: 77 (08-09-21 @ 06:36) (73 - 77)  BP: 118/71 (08-09-21 @ 06:36) (100/60 - 118/71)  RR: 15 (08-09-21 @ 06:36) (15 - 16)  SpO2: 98% (08-09-21 @ 06:36) (96% - 98%)  Wt(kg): --  I&O's Summary    08 Aug 2021 07:01  -  09 Aug 2021 07:00  --------------------------------------------------------  IN: 618 mL / OUT: 3275 mL / NET: -2657 mL    09 Aug 2021 07:01  -  09 Aug 2021 11:43  --------------------------------------------------------  IN: 0 mL / OUT: 800 mL / NET: -800 mL        Appearance: Normal	  Cardiovascular: Normal S1 S2,RRR, No JVD, No murmurs  Respiratory: Lungs clear to auscultation	  Gastrointestinal:  Soft, Non-tender, + BS	  Extremities: Normal range of motion, No clubbing, cyanosis or edema      Home Medications:  Aspirin Enteric Coated 81 mg oral delayed release tablet: 1 tab(s) orally once a day (03 Aug 2021 21:43)  Lasix 40 mg oral tablet: 1 tab(s) orally once a day (03 Aug 2021 21:43)  losartan 100 mg oral tablet: 1 tab(s) orally once a day (03 Aug 2021 21:43)  Plavix 75 mg oral tablet: 1 tab(s) orally once a day (03 Aug 2021 21:43)  timolol hemihydrate 0.5% ophthalmic solution: 1 drop(s) to each affected eye 2 times a day (03 Aug 2021 21:43)      MEDICATIONS  (STANDING):  aspirin enteric coated 81 milliGRAM(s) Oral daily  atorvastatin 40 milliGRAM(s) Oral at bedtime  dextrose 40% Gel 15 Gram(s) Oral once  dextrose 5%. 1000 milliLiter(s) (50 mL/Hr) IV Continuous <Continuous>  dextrose 5%. 1000 milliLiter(s) (100 mL/Hr) IV Continuous <Continuous>  dextrose 50% Injectable 25 Gram(s) IV Push once  dextrose 50% Injectable 12.5 Gram(s) IV Push once  dextrose 50% Injectable 25 Gram(s) IV Push once  glucagon  Injectable 1 milliGRAM(s) IntraMuscular once  insulin lispro (ADMELOG) corrective regimen sliding scale   SubCutaneous three times a day before meals  insulin lispro (ADMELOG) corrective regimen sliding scale   SubCutaneous at bedtime  losartan 50 milliGRAM(s) Oral daily  metolazone 2.5 milliGRAM(s) Oral daily  pantoprazole    Tablet 40 milliGRAM(s) Oral before breakfast  potassium chloride    Tablet ER 40 milliEquivalent(s) Oral once  timolol 0.5% Solution 1 Drop(s) Both EYES every 12 hours      TELEMETRY: NSR, 8 beats WCT this morning   ECG:  	  RADIOLOGY:   DIAGNOSTIC TESTING:  [ ] Echocardiogram:  [ ]  Catheterization:  [ ] Stress Test:    OTHER: 	    LABS:	 	    Troponin T, High Sensitivity Result: 48 ng/L (08-04 @ 03:41)  Troponin T, High Sensitivity Result: 50 ng/L (08-03 @ 20:58)                          12.6   6.51  )-----------( 189      ( 09 Aug 2021 07:19 )             37.5     08-09    135  |  92<L>  |  30<H>  ----------------------------<  85  3.9   |  29  |  1.38<H>    Ca    9.8      09 Aug 2021 07:19  Phos  3.7     08-09  Mg     2.10     08-09      PTT - ( 09 Aug 2021 07:19 )  PTT:66.1 sec

## 2021-08-09 NOTE — PROGRESS NOTE ADULT - ASSESSMENT
Cath 6/15/2020: s/p successful ROCIO placement to the proximal RCA and RPDA.  Cath 2020: s/p successful staged PCI to the mid LAD. Medical management of LCX disease.  Echo 2020: mild Mr, fx Bioprosthetic aortic valve replacement. EF 15-20%, Moderate concentric LVH. Severe global left ventricular systolic dysfunction. mild pulm htn    a/p  71 year old with  Hypertension, Coronary Artery Disease, S/P PCI ( s/p ROCIO placement to the proximal RCA and RPDA 6/15/2020, PCI to LAD 2020), Systolic Congestive Heart Failure, Aortic Stenosis, S/P bioprosthetic AVR , Paroxysmal Atrial Fibrillation, Prostate CA presenting with fluid overload, SOB    # Acute on Chronic Systolic Congestive Heart Failure  -volume status much improved w IV diureses and    -cr noted - lasix transitioned to PO Lasix 40 mg bid, cont metolazone 2.5 mg daily   -DC dobutamine 2.5mcg/KG for now   -ectopy noted on tele - eventual resume of bb   -echo with EF 14%, severe global LV dysfunction, decreased RV function, EP eval for ICD appreciated  -plan for ICD today   -strict i/o   -Continue arb   -hs trop neg- no acs   -LE doppler neg for DVT     # Hypertension  -bp much improved   -cont arb     #Coronary Artery Disease, S/P PCI.  -Recent Cath with Lcx disease and is s/p PCI to prox RCA, RPDA and LAD.  -Continue medical management for Lcx disease   -Stable without angina  -Continue ASA 81mg daily and plavix daily  -no acs     # Aortic Stenosis s/p BioAVR  -echo with functioning bioprosthetic AVR.     # Paroxysmal Atrial Fibrillation  -Stable and in NSR  -eventual resume of bb   -ChadsVac= 4, c/w hep gtt held for ICD placement     dvt ppx    plan discussed with ACP

## 2021-08-09 NOTE — PROGRESS NOTE ADULT - NSICDXPILOT_GEN_ALL_CORE
[place]

## 2021-08-09 NOTE — CHART NOTE - NSCHARTNOTEFT_GEN_A_CORE
NPO for ICD implantation today.  The benefits/risks/alternative of the procedure fully explained to patient, Long term management of ICD discussed, patient states understanding and agreed to proceed with ICD procedure.  Consent obtained.  Patient appears euvolemic.  Well diuresed on Dobutamine assisted diuresis.  Assessment see pre-procedure record. NPO for ICD implantation today.  The benefits/risks/alternative of the procedure fully explained to patient, Long term management of ICD discussed, patient states understanding and agreed to proceed with ICD procedure.  Consent obtained.  Patient appears euvolemic.  Well diuresed on Dobutamine assisted diuresis.  Assessment see pre-procedure record.  Per Dr. Wagner, d/c Heparin gtt now.  No plan for BRETT/DCCV as patient is in SR now.   Per referral cardiology team, Dobutamine gtt to be d/c today and change IV lasix to PO .

## 2021-08-10 LAB
ANION GAP SERPL CALC-SCNC: 14 MMOL/L — SIGNIFICANT CHANGE UP (ref 7–14)
ANION GAP SERPL CALC-SCNC: 15 MMOL/L — HIGH (ref 7–14)
BUN SERPL-MCNC: 38 MG/DL — HIGH (ref 7–23)
BUN SERPL-MCNC: 38 MG/DL — HIGH (ref 7–23)
CALCIUM SERPL-MCNC: 9.5 MG/DL — SIGNIFICANT CHANGE UP (ref 8.4–10.5)
CALCIUM SERPL-MCNC: 9.6 MG/DL — SIGNIFICANT CHANGE UP (ref 8.4–10.5)
CHLORIDE SERPL-SCNC: 90 MMOL/L — LOW (ref 98–107)
CHLORIDE SERPL-SCNC: 93 MMOL/L — LOW (ref 98–107)
CO2 SERPL-SCNC: 26 MMOL/L — SIGNIFICANT CHANGE UP (ref 22–31)
CO2 SERPL-SCNC: 28 MMOL/L — SIGNIFICANT CHANGE UP (ref 22–31)
CREAT SERPL-MCNC: 1.37 MG/DL — HIGH (ref 0.5–1.3)
CREAT SERPL-MCNC: 1.39 MG/DL — HIGH (ref 0.5–1.3)
GLUCOSE SERPL-MCNC: 127 MG/DL — HIGH (ref 70–99)
GLUCOSE SERPL-MCNC: 88 MG/DL — SIGNIFICANT CHANGE UP (ref 70–99)
MAGNESIUM SERPL-MCNC: 2.1 MG/DL — SIGNIFICANT CHANGE UP (ref 1.6–2.6)
PHOSPHATE SERPL-MCNC: 3.3 MG/DL — SIGNIFICANT CHANGE UP (ref 2.5–4.5)
POTASSIUM SERPL-MCNC: 4.2 MMOL/L — SIGNIFICANT CHANGE UP (ref 3.5–5.3)
POTASSIUM SERPL-MCNC: 4.5 MMOL/L — SIGNIFICANT CHANGE UP (ref 3.5–5.3)
POTASSIUM SERPL-SCNC: 4.2 MMOL/L — SIGNIFICANT CHANGE UP (ref 3.5–5.3)
POTASSIUM SERPL-SCNC: 4.5 MMOL/L — SIGNIFICANT CHANGE UP (ref 3.5–5.3)
SODIUM SERPL-SCNC: 130 MMOL/L — LOW (ref 135–145)
SODIUM SERPL-SCNC: 136 MMOL/L — SIGNIFICANT CHANGE UP (ref 135–145)

## 2021-08-10 PROCEDURE — 93010 ELECTROCARDIOGRAM REPORT: CPT

## 2021-08-10 PROCEDURE — 71045 X-RAY EXAM CHEST 1 VIEW: CPT | Mod: 26

## 2021-08-10 PROCEDURE — 33249 INSJ/RPLCMT DEFIB W/LEAD(S): CPT | Mod: 59

## 2021-08-10 RX ORDER — METOPROLOL TARTRATE 50 MG
25 TABLET ORAL DAILY
Refills: 0 | Status: DISCONTINUED | OUTPATIENT
Start: 2021-08-10 | End: 2021-08-12

## 2021-08-10 RX ORDER — DEXTROSE 50 % IN WATER 50 %
12.5 SYRINGE (ML) INTRAVENOUS ONCE
Refills: 0 | Status: COMPLETED | OUTPATIENT
Start: 2021-08-10 | End: 2021-08-10

## 2021-08-10 RX ORDER — CEFAZOLIN SODIUM 1 G
1000 VIAL (EA) INJECTION EVERY 8 HOURS
Refills: 0 | Status: COMPLETED | OUTPATIENT
Start: 2021-08-10 | End: 2021-08-11

## 2021-08-10 RX ORDER — DEXTROSE 50 % IN WATER 50 %
SYRINGE (ML) INTRAVENOUS
Refills: 0 | Status: COMPLETED | OUTPATIENT
Start: 2021-08-10 | End: 2021-08-10

## 2021-08-10 RX ADMIN — Medication 100 MILLIGRAM(S): at 21:17

## 2021-08-10 RX ADMIN — Medication 81 MILLIGRAM(S): at 17:42

## 2021-08-10 RX ADMIN — Medication 12.5 MILLILITER(S): at 11:31

## 2021-08-10 RX ADMIN — Medication 1 DROP(S): at 05:41

## 2021-08-10 RX ADMIN — Medication 1 DROP(S): at 17:42

## 2021-08-10 RX ADMIN — ATORVASTATIN CALCIUM 40 MILLIGRAM(S): 80 TABLET, FILM COATED ORAL at 21:17

## 2021-08-10 RX ADMIN — PANTOPRAZOLE SODIUM 40 MILLIGRAM(S): 20 TABLET, DELAYED RELEASE ORAL at 07:00

## 2021-08-10 NOTE — PROGRESS NOTE ADULT - ASSESSMENT
Cath 6/15/2020: s/p successful ROCIO placement to the proximal RCA and RPDA.  Cath 2020: s/p successful staged PCI to the mid LAD. Medical management of LCX disease.  Echo 2020: mild Mr, fx Bioprosthetic aortic valve replacement. EF 15-20%, Moderate concentric LVH. Severe global left ventricular systolic dysfunction. mild pulm htn    a/p  71 year old with  Hypertension, Coronary Artery Disease, S/P PCI ( s/p ROCIO placement to the proximal RCA and RPDA 6/15/2020, PCI to LAD 2020), Systolic Congestive Heart Failure, Aortic Stenosis, S/P bioprosthetic AVR , Paroxysmal Atrial Fibrillation, Prostate CA presenting with fluid overload, SOB    # Acute on Chronic Systolic Congestive Heart Failure  -volume status much improved w IV diureses and    -cr noted - cont PO Lasix 40 mg bid, cont metolazone 2.5 mg daily   -ectopy noted on tele - will hold arb for now given low bp and start Toprol XL 25 mg   -s/p dobutamine 2.5mcg/KG  -echo with EF 14%, severe global LV dysfunction, decreased RV function, EP eval for ICD appreciated  -plan for ICD today   -strict i/o   -hs trop neg- no acs   -LE doppler neg for DVT     # Hypertension  -borderline bp noted   -arb held as above, low dose bb started for ectopy     #Coronary Artery Disease, S/P PCI.  -Recent Cath with Lcx disease and is s/p PCI to prox RCA, RPDA and LAD.  -Continue medical management for Lcx disease   -Stable without angina  -Continue ASA 81mg daily and plavix daily  -no acs     # Aortic Stenosis s/p BioAVR  -echo with functioning bioprosthetic AVR.     # Paroxysmal Atrial Fibrillation  -Stable and in NSR  -eventual resume of bb   -ChadsVac= 4,  hep gtt held for ICD placement - resume oral ac when feasible     #Hyponatremia  -likely in setting of diureses  -pending repeat  -cont to moniior     dvt ppx    plan discussed with ACP    Cath 6/15/2020: s/p successful ROCIO placement to the proximal RCA and RPDA.  Cath 2020: s/p successful staged PCI to the mid LAD. Medical management of LCX disease.  Echo 2020: mild Mr, fx Bioprosthetic aortic valve replacement. EF 15-20%, Moderate concentric LVH. Severe global left ventricular systolic dysfunction. mild pulm htn    a/p  71 year old with  Hypertension, Coronary Artery Disease, S/P PCI ( s/p ROCIO placement to the proximal RCA and RPDA 6/15/2020, PCI to LAD 2020), Systolic Congestive Heart Failure, Aortic Stenosis, S/P bioprosthetic AVR , Paroxysmal Atrial Fibrillation, Prostate CA presenting with fluid overload, SOB    # Acute on Chronic Systolic Congestive Heart Failure  -volume status much improved w IV diureses and    -cr noted - cont PO Lasix 40 mg bid, cont metolazone 2.5 mg daily   -ectopy noted on tele - will hold arb for now given low bp and start Toprol XL 25 mg   -s/p dobutamine 2.5mcg/KG  -echo with EF 14%, severe global LV dysfunction, decreased RV function, EP eval for ICD appreciated  -plan for ICD today   -strict i/o   -hs trop neg- no acs   -LE doppler neg for DVT     # Hypertension  -borderline bp noted - hold lasix PO for today, resume tomorrow   -arb held as above, low dose bb started for ectopy     #Coronary Artery Disease, S/P PCI.  -Recent Cath with Lcx disease and is s/p PCI to prox RCA, RPDA and LAD.  -Continue medical management for Lcx disease   -Stable without angina  -Continue ASA 81mg daily and plavix daily  -no acs     # Aortic Stenosis s/p BioAVR  -echo with functioning bioprosthetic AVR.     # Paroxysmal Atrial Fibrillation  -Stable and in NSR  -eventual resume of bb   -ChadsVac= 4,  hep gtt held for ICD placement - resume oral ac when feasible     #Hyponatremia  -likely in setting of diureses  -repeat improved   -cont to moniior     dvt ppx    plan discussed with ACP

## 2021-08-10 NOTE — CHART NOTE - NSCHARTNOTEFT_GEN_A_CORE
NPO for ICD today.  14 beats of NSVT this am.  Am serum Na 130 noted, Repeat Serum Na stat ordered.  Assessment see preprocedure record.

## 2021-08-10 NOTE — PROGRESS NOTE ADULT - SUBJECTIVE AND OBJECTIVE BOX
CARDIOLOGY FOLLOW UP - Dr. Velázquez  Date of Service: 8/10/21  CC: hypotension noted - pt asymptomatic, denies cp, sob, and palpitations , dizziness     Review of Systems:  Constitutional: No fever, weight loss, or fatigue  Respiratory: No cough, wheezing, or hemoptysis, no shortness of breath  Cardiovascular: No chest pain, palpitations, passing out, dizziness, or leg swelling  Gastrointestinal: No abd or epigastric pain.  No nausea, vomiting, or hematemesis; no diarrhea or constipation, no melena or hematochezia  Vascular: no edema       PHYSICAL EXAM:  T(C): 36.6 (08-10-21 @ 09:19), Max: 36.7 (08-10-21 @ 05:36)  HR: 72 (08-10-21 @ 09:19) (72 - 77)  BP: 96/57 (08-10-21 @ 09:19) (90/60 - 107/61)  RR: 18 (08-10-21 @ 09:19) (16 - 18)  SpO2: 100% (08-10-21 @ 09:19) (99% - 100%)  Wt(kg): --  I&O's Summary    09 Aug 2021 07:01  -  10 Aug 2021 07:00  --------------------------------------------------------  IN: 0 mL / OUT: 1725 mL / NET: -1725 mL        Appearance: Normal	  Cardiovascular: Normal S1 S2,RRR, No JVD, No murmurs  Respiratory: Lungs clear to auscultation	  Gastrointestinal:  Soft, Non-tender, + BS	  Extremities: Normal range of motion, No clubbing, cyanosis or edema      Home Medications:  Aspirin Enteric Coated 81 mg oral delayed release tablet: 1 tab(s) orally once a day (03 Aug 2021 21:43)  Lasix 40 mg oral tablet: 1 tab(s) orally once a day (03 Aug 2021 21:43)  losartan 100 mg oral tablet: 1 tab(s) orally once a day (03 Aug 2021 21:43)  Plavix 75 mg oral tablet: 1 tab(s) orally once a day (03 Aug 2021 21:43)  timolol hemihydrate 0.5% ophthalmic solution: 1 drop(s) to each affected eye 2 times a day (03 Aug 2021 21:43)      MEDICATIONS  (STANDING):  aspirin enteric coated 81 milliGRAM(s) Oral daily  atorvastatin 40 milliGRAM(s) Oral at bedtime  dextrose 40% Gel 15 Gram(s) Oral once  dextrose 5%. 1000 milliLiter(s) (50 mL/Hr) IV Continuous <Continuous>  dextrose 5%. 1000 milliLiter(s) (100 mL/Hr) IV Continuous <Continuous>  dextrose 50% Injectable 25 Gram(s) IV Push once  dextrose 50% Injectable 12.5 Gram(s) IV Push once  dextrose 50% Injectable 25 Gram(s) IV Push once  furosemide    Tablet 40 milliGRAM(s) Oral two times a day  glucagon  Injectable 1 milliGRAM(s) IntraMuscular once  insulin lispro (ADMELOG) corrective regimen sliding scale   SubCutaneous three times a day before meals  insulin lispro (ADMELOG) corrective regimen sliding scale   SubCutaneous at bedtime  losartan 50 milliGRAM(s) Oral daily  metolazone 2.5 milliGRAM(s) Oral daily  pantoprazole    Tablet 40 milliGRAM(s) Oral before breakfast  timolol 0.5% Solution 1 Drop(s) Both EYES every 12 hours      TELEMETRY: NSR, 15 NSVT 	    ECG:  	  RADIOLOGY:   DIAGNOSTIC TESTING:  [ ] Echocardiogram:  [ ]  Catheterization:  [ ] Stress Test:    OTHER: 	    LABS:	 	    Troponin T, High Sensitivity Result: 48 ng/L (08-04 @ 03:41)  Troponin T, High Sensitivity Result: 50 ng/L (08-03 @ 20:58)                          12.6   6.51  )-----------( 189      ( 09 Aug 2021 07:19 )             37.5     08-10    130<L>  |  90<L>  |  38<H>  ----------------------------<  127<H>  4.5   |  26  |  1.37<H>    Ca    9.6      10 Aug 2021 00:16  Phos  3.3     08-10  Mg     2.10     08-10      PTT - ( 09 Aug 2021 07:19 )  PTT:66.1 sec

## 2021-08-10 NOTE — CHART NOTE - NSCHARTNOTEFT_GEN_A_CORE
FLORENCE RASHARD  [id number]    PROCEDURE:  Dual chamber ICD placement          INDICATION:  HFrEF        ELECTROPHYSIOLOGIST(S):  MD Bernard      FINDINGS:  - Successful placement of dual chamber BSC ICD.      COMPLICATIONS:  None      RECOMMENDATIONS:  - CXR portable now  - CXR AP/Lat in the AM  - Ancef 1g q8hrs x2 doses  - Hold off AC until cleared by EP

## 2021-08-10 NOTE — CHART NOTE - NSCHARTNOTEFT_GEN_A_CORE
Chart reviewed for length of stay nutrition assessment.  RD made 2 attempts to visit with patient this morning and afternoon, however, patient not back from cath lab recovery room at this time. RD to reattempt visiting with patient at a later time/date.

## 2021-08-11 ENCOUNTER — TRANSCRIPTION ENCOUNTER (OUTPATIENT)
Age: 71
End: 2021-08-11

## 2021-08-11 LAB
ANION GAP SERPL CALC-SCNC: 14 MMOL/L — SIGNIFICANT CHANGE UP (ref 7–14)
BASOPHILS # BLD AUTO: 0.06 K/UL — SIGNIFICANT CHANGE UP (ref 0–0.2)
BASOPHILS NFR BLD AUTO: 0.8 % — SIGNIFICANT CHANGE UP (ref 0–2)
BUN SERPL-MCNC: 36 MG/DL — HIGH (ref 7–23)
CALCIUM SERPL-MCNC: 9.6 MG/DL — SIGNIFICANT CHANGE UP (ref 8.4–10.5)
CHLORIDE SERPL-SCNC: 93 MMOL/L — LOW (ref 98–107)
CO2 SERPL-SCNC: 25 MMOL/L — SIGNIFICANT CHANGE UP (ref 22–31)
CREAT SERPL-MCNC: 1.32 MG/DL — HIGH (ref 0.5–1.3)
EOSINOPHIL # BLD AUTO: 0.5 K/UL — SIGNIFICANT CHANGE UP (ref 0–0.5)
EOSINOPHIL NFR BLD AUTO: 6.8 % — HIGH (ref 0–6)
GLUCOSE SERPL-MCNC: 117 MG/DL — HIGH (ref 70–99)
HCT VFR BLD CALC: 37.6 % — LOW (ref 39–50)
HGB BLD-MCNC: 12.3 G/DL — LOW (ref 13–17)
IANC: 5.42 K/UL — SIGNIFICANT CHANGE UP (ref 1.5–8.5)
IMM GRANULOCYTES NFR BLD AUTO: 0.4 % — SIGNIFICANT CHANGE UP (ref 0–1.5)
LYMPHOCYTES # BLD AUTO: 0.6 K/UL — LOW (ref 1–3.3)
LYMPHOCYTES # BLD AUTO: 8.2 % — LOW (ref 13–44)
MAGNESIUM SERPL-MCNC: 2.1 MG/DL — SIGNIFICANT CHANGE UP (ref 1.6–2.6)
MCHC RBC-ENTMCNC: 30.4 PG — SIGNIFICANT CHANGE UP (ref 27–34)
MCHC RBC-ENTMCNC: 32.7 GM/DL — SIGNIFICANT CHANGE UP (ref 32–36)
MCV RBC AUTO: 92.8 FL — SIGNIFICANT CHANGE UP (ref 80–100)
MONOCYTES # BLD AUTO: 0.75 K/UL — SIGNIFICANT CHANGE UP (ref 0–0.9)
MONOCYTES NFR BLD AUTO: 10.2 % — SIGNIFICANT CHANGE UP (ref 2–14)
NEUTROPHILS # BLD AUTO: 5.42 K/UL — SIGNIFICANT CHANGE UP (ref 1.8–7.4)
NEUTROPHILS NFR BLD AUTO: 73.6 % — SIGNIFICANT CHANGE UP (ref 43–77)
NRBC # BLD: 0 /100 WBCS — SIGNIFICANT CHANGE UP
NRBC # FLD: 0 K/UL — SIGNIFICANT CHANGE UP
PHOSPHATE SERPL-MCNC: 3.1 MG/DL — SIGNIFICANT CHANGE UP (ref 2.5–4.5)
PLATELET # BLD AUTO: 232 K/UL — SIGNIFICANT CHANGE UP (ref 150–400)
POTASSIUM SERPL-MCNC: 4.7 MMOL/L — SIGNIFICANT CHANGE UP (ref 3.5–5.3)
POTASSIUM SERPL-SCNC: 4.7 MMOL/L — SIGNIFICANT CHANGE UP (ref 3.5–5.3)
RBC # BLD: 4.05 M/UL — LOW (ref 4.2–5.8)
RBC # FLD: 13.3 % — SIGNIFICANT CHANGE UP (ref 10.3–14.5)
SODIUM SERPL-SCNC: 132 MMOL/L — LOW (ref 135–145)
WBC # BLD: 7.36 K/UL — SIGNIFICANT CHANGE UP (ref 3.8–10.5)
WBC # FLD AUTO: 7.36 K/UL — SIGNIFICANT CHANGE UP (ref 3.8–10.5)

## 2021-08-11 PROCEDURE — 71045 X-RAY EXAM CHEST 1 VIEW: CPT | Mod: 26

## 2021-08-11 RX ORDER — TRAMADOL HYDROCHLORIDE 50 MG/1
25 TABLET ORAL ONCE
Refills: 0 | Status: DISCONTINUED | OUTPATIENT
Start: 2021-08-11 | End: 2021-08-11

## 2021-08-11 RX ORDER — FUROSEMIDE 40 MG
1 TABLET ORAL
Qty: 0 | Refills: 0 | DISCHARGE
Start: 2021-08-11

## 2021-08-11 RX ORDER — ATORVASTATIN CALCIUM 80 MG/1
1 TABLET, FILM COATED ORAL
Qty: 30 | Refills: 0
Start: 2021-08-11 | End: 2021-09-09

## 2021-08-11 RX ORDER — PANTOPRAZOLE SODIUM 20 MG/1
1 TABLET, DELAYED RELEASE ORAL
Qty: 30 | Refills: 0
Start: 2021-08-11 | End: 2021-09-09

## 2021-08-11 RX ORDER — RIVAROXABAN 15 MG-20MG
1 KIT ORAL
Qty: 30 | Refills: 0
Start: 2021-08-11 | End: 2021-09-09

## 2021-08-11 RX ORDER — CLOPIDOGREL BISULFATE 75 MG/1
75 TABLET, FILM COATED ORAL DAILY
Refills: 0 | Status: DISCONTINUED | OUTPATIENT
Start: 2021-08-11 | End: 2021-08-12

## 2021-08-11 RX ORDER — LOSARTAN POTASSIUM 100 MG/1
1 TABLET, FILM COATED ORAL
Qty: 0 | Refills: 0 | DISCHARGE

## 2021-08-11 RX ORDER — ASPIRIN/CALCIUM CARB/MAGNESIUM 324 MG
1 TABLET ORAL
Qty: 0 | Refills: 0 | DISCHARGE

## 2021-08-11 RX ORDER — METOPROLOL TARTRATE 50 MG
1 TABLET ORAL
Qty: 30 | Refills: 0
Start: 2021-08-11 | End: 2021-09-09

## 2021-08-11 RX ADMIN — Medication 40 MILLIGRAM(S): at 06:40

## 2021-08-11 RX ADMIN — Medication 650 MILLIGRAM(S): at 02:26

## 2021-08-11 RX ADMIN — Medication 40 MILLIGRAM(S): at 18:14

## 2021-08-11 RX ADMIN — Medication 650 MILLIGRAM(S): at 03:26

## 2021-08-11 RX ADMIN — TRAMADOL HYDROCHLORIDE 25 MILLIGRAM(S): 50 TABLET ORAL at 20:44

## 2021-08-11 RX ADMIN — CLOPIDOGREL BISULFATE 75 MILLIGRAM(S): 75 TABLET, FILM COATED ORAL at 12:52

## 2021-08-11 RX ADMIN — Medication 100 MILLIGRAM(S): at 06:40

## 2021-08-11 RX ADMIN — Medication 1 DROP(S): at 18:13

## 2021-08-11 RX ADMIN — Medication 1 DROP(S): at 06:41

## 2021-08-11 RX ADMIN — Medication 25 MILLIGRAM(S): at 06:42

## 2021-08-11 RX ADMIN — TRAMADOL HYDROCHLORIDE 25 MILLIGRAM(S): 50 TABLET ORAL at 21:44

## 2021-08-11 RX ADMIN — ATORVASTATIN CALCIUM 40 MILLIGRAM(S): 80 TABLET, FILM COATED ORAL at 21:01

## 2021-08-11 RX ADMIN — PANTOPRAZOLE SODIUM 40 MILLIGRAM(S): 20 TABLET, DELAYED RELEASE ORAL at 06:40

## 2021-08-11 NOTE — DIETITIAN INITIAL EVALUATION ADULT. - PROBLEM SELECTOR PLAN 2
-afib called pharmacy not on BB or AC filled, not filled recently at [facility]  -pt is a poor historian will need collateral in am, sent to pharmacy  -heparin drip now, is on aspirin/plavix will start protonix given GI history

## 2021-08-11 NOTE — PROGRESS NOTE ADULT - ASSESSMENT
Cath 6/15/2020: s/p successful ROCIO placement to the proximal RCA and RPDA.  Cath 2020: s/p successful staged PCI to the mid LAD. Medical management of LCX disease.  Echo 2020: mild Mr, fx Bioprosthetic aortic valve replacement. EF 15-20%, Moderate concentric LVH. Severe global left ventricular systolic dysfunction. mild pulm htn    a/p  71 year old with  Hypertension, Coronary Artery Disease, S/P PCI ( s/p ROCIO placement to the proximal RCA and RPDA 6/15/2020, PCI to LAD 2020), Systolic Congestive Heart Failure, Aortic Stenosis, S/P bioprosthetic AVR , Paroxysmal Atrial Fibrillation, Prostate CA presenting with fluid overload, SOB    # Acute on Chronic Systolic Congestive Heart Failure  -volume status much improved w IV diureses and    -cr noted - cont PO Lasix 40 mg bid, cont metolazone 2.5 mg daily   -no further ectopy noted - cont bb   -hold arb for now given YSABEL   -s/p dobutamine 2.5mcg/KG  -echo with EF 14%, severe global LV dysfunction, decreased RV function  -s/p ICD - s/p iv abx    -strict i/o   -EP f/u     # Hypertension  -bp improved today   -cont low dose bb , lasix     #Coronary Artery Disease, S/P PCI.  -Recent Cath with Lcx disease and is s/p PCI to prox RCA, RPDA and LAD.  -Continue medical management for Lcx disease   -Stable without angina  -will cont only Plavix and hold asa given plans to resume Xarelto   -no acs     # Aortic Stenosis s/p BioAVR  -echo with functioning bioprosthetic AVR.     # Paroxysmal Atrial Fibrillation  -Stable and in NSR  -BB resumed   -ChadsVac= 4,  AC held for now - resume PO AC per EP     #Hyponatremia  -likely in setting of diureses  -repeat improved   -cont to moniior     dvt ppx    plan discussed with ACP    Cath 6/15/2020: s/p successful ROCIO placement to the proximal RCA and RPDA.  Cath 2020: s/p successful staged PCI to the mid LAD. Medical management of LCX disease.  Echo 2020: mild Mr, fx Bioprosthetic aortic valve replacement. EF 15-20%, Moderate concentric LVH. Severe global left ventricular systolic dysfunction. mild pulm htn    a/p  71 year old with  Hypertension, Coronary Artery Disease, S/P PCI ( s/p ROCIO placement to the proximal RCA and RPDA 6/15/2020, PCI to LAD 2020), Systolic Congestive Heart Failure, Aortic Stenosis, S/P bioprosthetic AVR , Paroxysmal Atrial Fibrillation, Prostate CA presenting with fluid overload, SOB    # Acute on Chronic Systolic Congestive Heart Failure  -volume status much improved w IV diureses and    -cr noted - cont PO Lasix 40 mg bid, cont metolazone 2.5 mg daily   -no further ectopy noted - cont bb   -hold arb for now given YSABEL   -s/p dobutamine 2.5mcg/KG  -echo with EF 14%, severe global LV dysfunction, decreased RV function  -s/p ICD - s/p iv abx    -strict i/o   -EP f/u     # Hypertension  -bp improved today   -cont low dose bb , lasix     #Coronary Artery Disease, S/P PCI.  -Recent Cath with Lcx disease and is s/p PCI to prox RCA, RPDA and LAD.  -Continue medical management for Lcx disease   -Stable without angina  -will cont only Plavix and hold asa given plans to resume Coumadin   -no acs     # Aortic Stenosis s/p BioAVR  -echo with functioning bioprosthetic AVR.     # Paroxysmal Atrial Fibrillation  -Stable and in NSR  -BB resumed   -ChadsVac= 4,  AC held for now - resume PO AC per EP     #Hyponatremia  -likely in setting of diureses  -repeat improved   -cont to moniior     dvt ppx    plan discussed with ACP

## 2021-08-11 NOTE — CHART NOTE - NSCHARTNOTEFT_GEN_A_CORE
Patient is s/p AICD placmement. Patient without any complaints. Site is stable with no hematoma or active bleed noted. No swelling, dressing is clean/intact/dry. Radial pulses palpable BL.  strength is equal bilaterally. Patient has full ROM in UE. Capillary refill < 2 seconds. Will continue to monitor closely.     ALONZO Yost  Department of Medicine   In House # [id number]

## 2021-08-11 NOTE — DISCHARGE NOTE PROVIDER - HOSPITAL COURSE
71 year old with  Hypertension, Coronary Artery Disease, S/P PCI ( s/p ROCIO placement to the proximal RCA and RPDA 6/15/2020, PCI to LAD 6/16/2020), Systolic Congestive Heart Failure, Aortic Stenosis, S/P bioprosthetic AVR , Paroxysmal Atrial Fibrillation, Prostate CA presenting with fluid overload, SOB. Pt was admitted with acute on chronic systolic congestive heart failure. Volume status much improved w IV diureses and dobutamine gtt. Now on PO Lasix 40 mg bid, cont metolazone 2.5 mg daily. BB added for ectopy noted on tele, will hold arb for now given YSABEL. Echo with EF 14%, severe global LV dysfunction, decreased RV function. Pt was seen by EP for ICD placement. S/p dual chamber ICD ([employer] implantation for primary prevention on 8/10. ICD interrogation done by [employer] Rep-normal device function. Pt with hx CAD and recent PCI - will cont only Plavix and hold asa given plans to resume Coumadin on 8/12.    71 year old with  Hypertension, Coronary Artery Disease, S/P PCI ( s/p ROCIO placement to the proximal RCA and RPDA 6/15/2020, PCI to LAD 6/16/2020), Systolic Congestive Heart Failure, Aortic Stenosis, S/P bioprosthetic AVR , Paroxysmal Atrial Fibrillation, Prostate CA presenting with fluid overload, SOB. Pt was admitted with acute on chronic systolic congestive heart failure. Volume status much improved w IV diureses and dobutamine gtt. Now on PO Lasix 40 mg bid, cont metolazone 2.5 mg daily. BB added for ectopy noted on tele, will hold arb for now given YSABEL. Echo with EF 14%, severe global LV dysfunction, decreased RV function. Pt was seen by EP for ICD placement. S/p dual chamber ICD ([employer] implantation for primary prevention on 8/10. ICD interrogation done by [employer] Rep-normal device function. Pt with hx CAD and recent PCI - will cont only Plavix and hold asa given plans to resume Coumadin on 8/12 as per Dr. Velázquez.    Discussed case with cardiology, pt cleared for discharge today. Follow up appointment for INR check was scheduled with the pt's PCP, Dr. Camp, pt made aware and will follow up at that time.

## 2021-08-11 NOTE — PROGRESS NOTE ADULT - SUBJECTIVE AND OBJECTIVE BOX
Patient is a 71y old  Male who presents with a chief complaint of sob HF exacerbation (10 Aug 2021 10:18)  Patient denies CP, SOB or palpitations.  Mild incisional site pain.     PAST MEDICAL & SURGICAL HISTORY:  Heart Murmur    HTN - Hypertension    Headache, Migraine  16 yrs old    Pulmonary Tuberculosis  age 18 yrs old, treated    Gastric Ulcer  Hx of    Allergy, Food  coffee and food colors, rash and pigment formation of trunk    Dizziness  from 10/10    AF (Paroxysmal Atrial Fibrillation)    S/P Appendectomy    S/P Hernia Surgery  right, 18 yrs old    S/P T&amp;A    S/P Hemorrhoidectomy  26 yrs old    Aortic Valve Replaced  1/10/11        MEDICATIONS  (STANDING):  aspirin enteric coated 81 milliGRAM(s) Oral daily  atorvastatin 40 milliGRAM(s) Oral at bedtime  dextrose 40% Gel 15 Gram(s) Oral once  dextrose 50% Injectable 25 Gram(s) IV Push once  dextrose 50% Injectable 12.5 Gram(s) IV Push once  dextrose 50% Injectable 25 Gram(s) IV Push once  furosemide    Tablet 40 milliGRAM(s) Oral two times a day  metolazone 2.5 milliGRAM(s) Oral daily  metoprolol succinate ER 25 milliGRAM(s) Oral daily  pantoprazole    Tablet 40 milliGRAM(s) Oral before breakfast  timolol 0.5% Solution 1 Drop(s) Both EYES every 12 hours    MEDICATIONS  (PRN):  acetaminophen   Tablet .. 650 milliGRAM(s) Oral every 6 hours PRN Temp greater or equal to 38C (100.4F), Mild Pain (1 - 3), Moderate Pain (4 - 6)            Vital Signs Last 24 Hrs  T(C): 36.7 (11 Aug 2021 06:30), Max: 36.8 (10 Aug 2021 22:00)  T(F): 98.1 (11 Aug 2021 06:30), Max: 98.2 (10 Aug 2021 22:00)  HR: 74 (11 Aug 2021 06:30) (67 - 74)  BP: 122/77 (11 Aug 2021 06:30) (109/73 - 122/77)  BP(mean): --  RR: 16 (11 Aug 2021 06:30) (16 - 18)  SpO2: 100% (11 Aug 2021 06:30) (99% - 100%)            INTERPRETATION OF TELEMETRY:  SR 70's occasional PVC's, no VT/NSVT seen    ECG:        LABS:    08-11    132<L>  |  93<L>  |  36<H>  ----------------------------<  117<H>  4.7   |  25  |  1.32<H>    Ca    9.6      11 Aug 2021 07:54  Phos  3.1     08-11  Mg     2.10     08-11                BNP  RADIOLOGY & ADDITIONAL STUDIES:  CXR: 6/11       PHYSICAL EXAM:    GENERAL: In no apparent distress, well nourished, and hydrated.  NECK: Supple and normal thyroid.  No JVD or carotid bruit.  Carotid pulse is 2+ bilaterally.  HEART: Regular rate and rhythm; No murmurs, rubs, or gallops.  Left side ACW incision site with steri strips dry intact, no active bleeding or hematoma, mild soft tissue swelling mild erythema noted  PULMONARY: Clear to auscultation and perfusion.  No rales, wheezing, or rhonchi bilaterally.  ABDOMEN: Soft, Nontender, Nondistended; Bowel sounds present  EXTREMITIES:  2+ Peripheral Pulses, No clubbing, cyanosis, or edema  NEUROLOGICAL: Grossly nonfocal

## 2021-08-11 NOTE — DISCHARGE NOTE PROVIDER - NSDCFUADDAPPT_GEN_ALL_CORE_FT
Follow up with EP on 8/25/2021 at 11:00am   Follow up with Dr. Velázquez on ---- Continue Coumadin 3mg. Follow up with Dr. Camp on Monday 8/16 at 11 am for INR check at [address] [facility].   Follow up with Dr. Camp on Monday 8/23 for a general follow up    Follow up with EP on 8/25/2021 at 11:00am   Follow up with Dr. Velázquez on 8/31 @ 4pm

## 2021-08-11 NOTE — DISCHARGE NOTE PROVIDER - NSDCMRMEDTOKEN_GEN_ALL_CORE_FT
furosemide 40 mg oral tablet: 1 tab(s) orally 2 times a day  Lasix 40 mg oral tablet: 1 tab(s) orally once a day  metOLazone 2.5 mg oral tablet: 1 tab(s) orally once a day  Plavix 75 mg oral tablet: 1 tab(s) orally once a day  timolol hemihydrate 0.5% ophthalmic solution: 1 drop(s) to each affected eye 2 times a day   atorvastatin 40 mg oral tablet: 1 tab(s) orally once a day (at bedtime)  furosemide 40 mg oral tablet: 1 tab(s) orally 2 times a day  metOLazone 2.5 mg oral tablet: 1 tab(s) orally once a day  metoprolol succinate 25 mg oral tablet, extended release: 1 tab(s) orally once a day  pantoprazole 40 mg oral delayed release tablet: 1 tab(s) orally once a day (before a meal)  Plavix 75 mg oral tablet: 1 tab(s) orally once a day   timolol hemihydrate 0.5% ophthalmic solution: 1 drop(s) to each affected eye 2 times a day  warfarin 3 mg oral tablet: 1 tab(s) orally once a day (at bedtime)

## 2021-08-11 NOTE — DIETITIAN INITIAL EVALUATION ADULT. - PERTINENT MEDS FT
MEDICATIONS  (STANDING):  atorvastatin 40 milliGRAM(s) Oral at bedtime  clopidogrel Tablet 75 milliGRAM(s) Oral daily  dextrose 40% Gel 15 Gram(s) Oral once  dextrose 50% Injectable 25 Gram(s) IV Push once  dextrose 50% Injectable 12.5 Gram(s) IV Push once  dextrose 50% Injectable 25 Gram(s) IV Push once  furosemide    Tablet 40 milliGRAM(s) Oral two times a day  metolazone 2.5 milliGRAM(s) Oral daily  metoprolol succinate ER 25 milliGRAM(s) Oral daily  pantoprazole    Tablet 40 milliGRAM(s) Oral before breakfast  timolol 0.5% Solution 1 Drop(s) Both EYES every 12 hours    MEDICATIONS  (PRN):  acetaminophen   Tablet .. 650 milliGRAM(s) Oral every 6 hours PRN Temp greater or equal to 38C (100.4F), Mild Pain (1 - 3), Moderate Pain (4 - 6)

## 2021-08-11 NOTE — DISCHARGE NOTE PROVIDER - DETAILS OF MALNUTRITION DIAGNOSIS/DIAGNOSES
This patient has been assessed with a concern for Malnutrition and was treated during this hospitalization for the following Nutrition diagnosis/diagnoses:     -  08/11/2021: Severe protein-calorie malnutrition

## 2021-08-11 NOTE — DIETITIAN INITIAL EVALUATION ADULT. - OTHER INFO
RD visited with patient for length of stay nutrition assessment.  Patient endorses fair appetite at present, patient was NPO yesterday for ICD.    No GI distress reported i.e. nausea, vomiting, diarrhea. No chewing or swallowing difficulties reported. Denies having food allergy to coffee and food color as listed in allergy section of chart, he stated "I am not sure why it says that, I don't have any food allergies".  Patient reported usual body weight of 210 pounds 2 month ago (95.5kg), endorses weight loss; weight today 8/11/21 - 72kg per chart; wt loss of 23.5kg in ~2 months, 24.6%; patient reported diminished appetite during this time, was eating half of what he would normally eat during this time.  During hospitalization, s/p IV lasix, now on oral furosemide. Patient was somnolent at time of visit, kept eyes closed and did not wish to receive diet education at this time.  Offered Ensure shakes and patient was amenable.

## 2021-08-11 NOTE — DISCHARGE NOTE PROVIDER - CARE PROVIDER_API CALL
Bro Velázquez)  Cardiovascular Disease; Interventional Cardiology; Nuclear Cardiology  [address], [address]  [place] [address]  Phone: [phone]  Fax: [phone]  Follow Up Time:     Ronal Wagner)  Cardiovascular Disease; Internal Medicine  [address] [place] [address]  Phone: [phone]  Fax: [phone]  Follow Up Time:

## 2021-08-11 NOTE — DISCHARGE NOTE PROVIDER - NSDCCPCAREPLAN_GEN_ALL_CORE_FT
PRINCIPAL DISCHARGE DIAGNOSIS  Diagnosis: CHF exacerbation  Assessment and Plan of Treatment: You were given medications lasix and dobutamine through your IV to rid your body of excess built up fluid. Continue oral lasix and Metolazone. An ICD/defibrillator was placed by Electrohysiology to prevent sudden cardiac death. COntinue low salt diet, fluid restriction to 1500 ml daily, monitor your fluid intake and weight daily, exercise as tolerated 30 minutes daily, and follow up with cardiology and electrophysiology as scheduled.      SECONDARY DISCHARGE DIAGNOSES  Diagnosis: AF (Paroxysmal Atrial Fibrillation)  Assessment and Plan of Treatment: Continue Coumadin -----     PRINCIPAL DISCHARGE DIAGNOSIS  Diagnosis: CHF exacerbation  Assessment and Plan of Treatment: You were given medications lasix and dobutamine through your IV to rid your body of excess built up fluid. Continue oral lasix and Metolazone. An ICD/defibrillator was placed by Electrohysiology to prevent sudden cardiac death. COntinue low salt diet, fluid restriction to 1500 ml daily, monitor your fluid intake and weight daily, exercise as tolerated 30 minutes daily, and follow up with cardiology and electrophysiology as scheduled.      SECONDARY DISCHARGE DIAGNOSES  Diagnosis: AF (Paroxysmal Atrial Fibrillation)  Assessment and Plan of Treatment: Please take Coumadin 3mg once a day at night. An appointment was scheduled for you for INR check on Monday 8/16 at 11am. Dr. Camp will check your INR at that time and adjust dose of Coumadin at that time.    Diagnosis: CAD (coronary artery disease)  Assessment and Plan of Treatment: COntinue Plavix. STOP aspirin. Follow up with your cardiologist for monitoring.

## 2021-08-11 NOTE — DIETITIAN INITIAL EVALUATION ADULT. - PERTINENT LABORATORY DATA
08-11 Na132 mmol/L<L> Glu 117 mg/dL<H> K+ 4.7 mmol/L Cr  1.32 mg/dL<H> BUN 36 mg/dL<H> 08-11 Phos 3.1 mg/dL 08-04 Chol 110 mg/dL LDL --    HDL 32 mg/dL<L> Trig 42 mg/dL    CAPILLARY BLOOD GLUCOSE  POCT Blood Glucose.: 120 mg/dL (10 Aug 2021 21:36)  POCT Blood Glucose.: 111 mg/dL (10 Aug 2021 17:32)    A1C with Estimated Average Glucose in AM (08.04.21 @ 03:41)    A1C with Estimated Average Glucose Result: 5.4: High Risk (prediabetic)    5.7 - 6.4 %  Diabetic, diagnostic           > 6.5 %  ADA diabetic treatment goal    < 7.0 %  HbA1C values may not accurately reflect mean blood glucose in patients  with Hb variants.  Suggest clinical correlation. %    Estimated Average Glucose: 108

## 2021-08-11 NOTE — PROGRESS NOTE ADULT - SUBJECTIVE AND OBJECTIVE BOX
CARDIOLOGY FOLLOW UP - Dr. Velázquez  Date of Service: 8/11/21  CC: denies cp, sob, and palpitations   s/p ICD 8/10 - mild pain at incision site     Review of Systems:  Constitutional: No fever, weight loss, or fatigue  Respiratory: No cough, wheezing, or hemoptysis, no shortness of breath  Cardiovascular: No chest pain, palpitations, passing out, dizziness, or leg swelling  Gastrointestinal: No abd or epigastric pain.  No nausea, vomiting, or hematemesis; no diarrhea or constipation, no melena or hematochezia  Vascular: no edema       PHYSICAL EXAM:  T(C): 36.7 (08-11-21 @ 06:30), Max: 36.8 (08-10-21 @ 22:00)  HR: 74 (08-11-21 @ 06:30) (67 - 74)  BP: 122/77 (08-11-21 @ 06:30) (109/73 - 122/77)  RR: 16 (08-11-21 @ 06:30) (16 - 18)  SpO2: 100% (08-11-21 @ 06:30) (99% - 100%)  Wt(kg): --  I&O's Summary    10 Aug 2021 07:01  -  11 Aug 2021 07:00  --------------------------------------------------------  IN: 700 mL / OUT: 1150 mL / NET: -450 mL        Appearance: Normal	  Cardiovascular: Normal S1 S2,RRR, No JVD, No murmurs  Respiratory: Lungs clear to auscultation	  Gastrointestinal:  Soft, Non-tender, + BS	  Extremities: Normal range of motion, No clubbing, cyanosis or edema      Home Medications:  Aspirin Enteric Coated 81 mg oral delayed release tablet: 1 tab(s) orally once a day (03 Aug 2021 21:43)  Lasix 40 mg oral tablet: 1 tab(s) orally once a day (03 Aug 2021 21:43)  losartan 100 mg oral tablet: 1 tab(s) orally once a day (03 Aug 2021 21:43)  Plavix 75 mg oral tablet: 1 tab(s) orally once a day (03 Aug 2021 21:43)  timolol hemihydrate 0.5% ophthalmic solution: 1 drop(s) to each affected eye 2 times a day (03 Aug 2021 21:43)      MEDICATIONS  (STANDING):  atorvastatin 40 milliGRAM(s) Oral at bedtime  clopidogrel Tablet 75 milliGRAM(s) Oral daily  dextrose 40% Gel 15 Gram(s) Oral once  dextrose 50% Injectable 25 Gram(s) IV Push once  dextrose 50% Injectable 12.5 Gram(s) IV Push once  dextrose 50% Injectable 25 Gram(s) IV Push once  furosemide    Tablet 40 milliGRAM(s) Oral two times a day  metolazone 2.5 milliGRAM(s) Oral daily  metoprolol succinate ER 25 milliGRAM(s) Oral daily  pantoprazole    Tablet 40 milliGRAM(s) Oral before breakfast  timolol 0.5% Solution 1 Drop(s) Both EYES every 12 hours      TELEMETRY: 	NSR     ECG:  	  RADIOLOGY:     DIAGNOSTIC TESTING:  [ ] Echocardiogram:  [ ]  Catheterization:  [ ] Stress Test:    OTHER: 	    LABS:	 	                            12.3   7.36  )-----------( 232      ( 11 Aug 2021 10:55 )             37.6     08-11    132<L>  |  93<L>  |  36<H>  ----------------------------<  117<H>  4.7   |  25  |  1.32<H>    Ca    9.6      11 Aug 2021 07:54  Phos  3.1     08-11  Mg     2.10     08-11

## 2021-08-11 NOTE — DIETITIAN INITIAL EVALUATION ADULT. - PHYSICAL ASSESSMENT DORSAL HAND
Your lab results have been reviewed and are as follows:    Cholesterol Panel: Very much improved!! Total Cholesterol is 163 (goal <200). Triglycerides are 175. (goal <150)  Your HDL or \"good\" cholesterol is 43. (goal >40). Your LDL or \"bad\" cholesterol is 85.  (goal <100). Benito,      Miranda Hester, MSN, MHA, FNP-BC mild

## 2021-08-11 NOTE — PROGRESS NOTE ADULT - ASSESSMENT
71M with CAD s/p ROCIO pRCA and RPDA 6/15/2020, LAD 6/16/2020, chronic HFrEF, AS s/p bioAVR 12/2010, pAF on Xarelto, HTN, preDM, prostate cancer s/p prostatectomy and LND, who presented at Dr. Velázquez's office for SOB in the setting of acute on chronic HF. EP consulted for ICD eval. Patient required Dobutamine assisted diuresis.  s/p dual chamber ICD ([employer] implantation for primary prevention on 8/10.   -ICD interrogation done by [employer] Rep-normal device function  -Completed Ancef x2 doses for prophylaxis  -Post ICD teaching done, follow  up on 8/25/2021 at 11:00am   -Will follow up CXR (PA/LA) result   -Please obtain CBC today  -Discussed with Dr. Wagner, resume AC Xarelto 20mg daily on 8/12 for PAF ([id number] 4:CHF, HTN, age, vasc), continue ASA 81mg daily (off Plavix).

## 2021-08-12 ENCOUNTER — TRANSCRIPTION ENCOUNTER (OUTPATIENT)
Age: 71
End: 2021-08-12

## 2021-08-12 VITALS
HEART RATE: 69 BPM | DIASTOLIC BLOOD PRESSURE: 69 MMHG | TEMPERATURE: 98 F | SYSTOLIC BLOOD PRESSURE: 101 MMHG | RESPIRATION RATE: 16 BRPM | OXYGEN SATURATION: 100 %

## 2021-08-12 LAB
ANION GAP SERPL CALC-SCNC: 12 MMOL/L — SIGNIFICANT CHANGE UP (ref 7–14)
APTT BLD: 30.3 SEC — SIGNIFICANT CHANGE UP (ref 27–36.3)
BUN SERPL-MCNC: 29 MG/DL — HIGH (ref 7–23)
CALCIUM SERPL-MCNC: 9.6 MG/DL — SIGNIFICANT CHANGE UP (ref 8.4–10.5)
CHLORIDE SERPL-SCNC: 92 MMOL/L — LOW (ref 98–107)
CO2 SERPL-SCNC: 25 MMOL/L — SIGNIFICANT CHANGE UP (ref 22–31)
CREAT SERPL-MCNC: 1.16 MG/DL — SIGNIFICANT CHANGE UP (ref 0.5–1.3)
GLUCOSE SERPL-MCNC: 119 MG/DL — HIGH (ref 70–99)
HCT VFR BLD CALC: 35.3 % — LOW (ref 39–50)
HGB BLD-MCNC: 11.7 G/DL — LOW (ref 13–17)
INR BLD: 1.41 RATIO — HIGH (ref 0.88–1.16)
MAGNESIUM SERPL-MCNC: 2.1 MG/DL — SIGNIFICANT CHANGE UP (ref 1.6–2.6)
MCHC RBC-ENTMCNC: 30.4 PG — SIGNIFICANT CHANGE UP (ref 27–34)
MCHC RBC-ENTMCNC: 33.1 GM/DL — SIGNIFICANT CHANGE UP (ref 32–36)
MCV RBC AUTO: 91.7 FL — SIGNIFICANT CHANGE UP (ref 80–100)
NRBC # BLD: 0 /100 WBCS — SIGNIFICANT CHANGE UP
NRBC # FLD: 0 K/UL — SIGNIFICANT CHANGE UP
PHOSPHATE SERPL-MCNC: 2.5 MG/DL — SIGNIFICANT CHANGE UP (ref 2.5–4.5)
PLATELET # BLD AUTO: 225 K/UL — SIGNIFICANT CHANGE UP (ref 150–400)
POTASSIUM SERPL-MCNC: 4.4 MMOL/L — SIGNIFICANT CHANGE UP (ref 3.5–5.3)
POTASSIUM SERPL-SCNC: 4.4 MMOL/L — SIGNIFICANT CHANGE UP (ref 3.5–5.3)
PROTHROM AB SERPL-ACNC: 15.8 SEC — HIGH (ref 10.6–13.6)
RBC # BLD: 3.85 M/UL — LOW (ref 4.2–5.8)
RBC # FLD: 13.3 % — SIGNIFICANT CHANGE UP (ref 10.3–14.5)
SODIUM SERPL-SCNC: 129 MMOL/L — LOW (ref 135–145)
WBC # BLD: 7.86 K/UL — SIGNIFICANT CHANGE UP (ref 3.8–10.5)
WBC # FLD AUTO: 7.86 K/UL — SIGNIFICANT CHANGE UP (ref 3.8–10.5)

## 2021-08-12 RX ORDER — CLOPIDOGREL BISULFATE 75 MG/1
1 TABLET, FILM COATED ORAL
Qty: 30 | Refills: 0
Start: 2021-08-12 | End: 2021-09-10

## 2021-08-12 RX ORDER — WARFARIN SODIUM 2.5 MG/1
3 TABLET ORAL ONCE
Refills: 0 | Status: COMPLETED | OUTPATIENT
Start: 2021-08-12 | End: 2021-08-12

## 2021-08-12 RX ORDER — CLOPIDOGREL BISULFATE 75 MG/1
1 TABLET, FILM COATED ORAL
Qty: 0 | Refills: 0 | DISCHARGE

## 2021-08-12 RX ORDER — WARFARIN SODIUM 2.5 MG/1
1 TABLET ORAL
Qty: 30 | Refills: 0
Start: 2021-08-12 | End: 2021-09-10

## 2021-08-12 RX ORDER — FUROSEMIDE 40 MG
1 TABLET ORAL
Qty: 0 | Refills: 0 | DISCHARGE

## 2021-08-12 RX ORDER — FUROSEMIDE 40 MG
1 TABLET ORAL
Qty: 60 | Refills: 0
Start: 2021-08-12 | End: 2021-09-10

## 2021-08-12 RX ADMIN — WARFARIN SODIUM 3 MILLIGRAM(S): 2.5 TABLET ORAL at 17:19

## 2021-08-12 RX ADMIN — Medication 40 MILLIGRAM(S): at 17:19

## 2021-08-12 RX ADMIN — CLOPIDOGREL BISULFATE 75 MILLIGRAM(S): 75 TABLET, FILM COATED ORAL at 12:06

## 2021-08-12 RX ADMIN — Medication 40 MILLIGRAM(S): at 06:29

## 2021-08-12 RX ADMIN — Medication 25 MILLIGRAM(S): at 06:29

## 2021-08-12 RX ADMIN — Medication 1 DROP(S): at 06:29

## 2021-08-12 RX ADMIN — PANTOPRAZOLE SODIUM 40 MILLIGRAM(S): 20 TABLET, DELAYED RELEASE ORAL at 06:29

## 2021-08-12 NOTE — PROGRESS NOTE ADULT - ASSESSMENT
71M with CAD and stents, HFrEF, AS, s/p bioprosthetic AVR, PAF on Xarelto, HTN, preDM and prostate CA presented to PMD with SOB with acute on chronic HF exacerbation requiring inotrope assisted diuresis. Now s/p dual chamber ICD for primary prevention.     S/P ICD placement  - site is clean and dry, pressure dressing removed, no evidence of bleeding. Mild swelling but able to outline device  - CBC stable  - Patient seen and examined with gisell Kim from EP perspective for discharge. Would however plan to restart Xarelto as outpatient on 8/13. Patient advised to report any bleeding or increase in pain/tenderness immediately.

## 2021-08-12 NOTE — DISCHARGE NOTE NURSING/CASE MANAGEMENT/SOCIAL WORK - NSSCNAMETXT_GEN_ALL_CORE
[facility]. This agency will send a nurse to your home to evaluate your care.

## 2021-08-12 NOTE — PROGRESS NOTE ADULT - ATTENDING COMMENTS
70 y/o male with CAD s/p ROCIO pRCA and RPDA 6/15/2020, LAD 6/16/2020, chronic HFrEF, AS s/p bioAVR 12/2010, pAF on Xarelto, HTN, preDM, prostate cancer s/p prostatectomy and LND, who presented at Dr. Velázquez's office for SOB in the setting of acute on chronic HF. EP consulted for ICD eval. Patient now s/p dual chamber ICD ([employer] implantation for primary prevention on 8/10). Resume Xarelto on 8/12. Follow up on 8/25/21 for wound check as outpatient.
s/p ICD  Surgical site clean, dry and intact.
71M with CAD s/p ROCIO pRCA and RPDA 6/15/2020, LAD 6/16/2020, HFrEF with EF <35%, AS s/p bioAVR 12/2010, pAF on Xarelto, HTN, preDM, prostate cancer s/p prostatectomy and LND who is admitted with HF. EP consulted for primary prevention ICD. Will plan placement tomorrow.

## 2021-08-12 NOTE — PROGRESS NOTE ADULT - SUBJECTIVE AND OBJECTIVE BOX
Subjective/Objective: Resting in bed, no complaints. ICD site dressing removed, area clean and dry. Mild swelling but appears primarily outline of device, no real tenderness, seen with Dr. Wagner.     MEDICATIONS  (STANDING):  atorvastatin 40 milliGRAM(s) Oral at bedtime  clopidogrel Tablet 75 milliGRAM(s) Oral daily  dextrose 40% Gel 15 Gram(s) Oral once  dextrose 50% Injectable 25 Gram(s) IV Push once  dextrose 50% Injectable 12.5 Gram(s) IV Push once  dextrose 50% Injectable 25 Gram(s) IV Push once  furosemide    Tablet 40 milliGRAM(s) Oral two times a day  metolazone 2.5 milliGRAM(s) Oral daily  metoprolol succinate ER 25 milliGRAM(s) Oral daily  pantoprazole    Tablet 40 milliGRAM(s) Oral before breakfast  timolol 0.5% Solution 1 Drop(s) Both EYES every 12 hours    MEDICATIONS  (PRN):  acetaminophen   Tablet .. 650 milliGRAM(s) Oral every 6 hours PRN Temp greater or equal to 38C (100.4F), Mild Pain (1 - 3), Moderate Pain (4 - 6)          Vital Signs Last 24 Hrs  T(C): 36.8 (12 Aug 2021 06:26), Max: 36.8 (11 Aug 2021 20:48)  T(F): 98.2 (12 Aug 2021 06:26), Max: 98.3 (11 Aug 2021 20:48)  HR: 66 (12 Aug 2021 06:26) (66 - 86)  BP: 112/68 (12 Aug 2021 06:26) (99/64 - 118/74)  BP(mean): --  RR: 16 (12 Aug 2021 06:26) (16 - 18)  SpO2: 100% (12 Aug 2021 06:26) (98% - 100%)  I&O's Detail    11 Aug 2021 07:01  -  12 Aug 2021 07:00  --------------------------------------------------------  IN:    Oral Fluid: 836 mL  Total IN: 836 mL    OUT:    Voided (mL): 2300 mL  Total OUT: 2300 mL    Total NET: -1464 mL      12 Aug 2021 07:01  -  12 Aug 2021 08:58  --------------------------------------------------------  IN:  Total IN: 0 mL    OUT:    Voided (mL): 700 mL  Total OUT: 700 mL    Total NET: -700 mL    PHYSICAL EXAM  GEN: NAD, skin W & D  RESP: CTA ant  CV: nl S1S2  GI: soft, NT/ND, BS +  EXT: no C/C/E  NEURO: A & O X 3    EKG/ TELEM: NSR alternating with appropriately paced rhythm    LABS:                          11.7   7.86  )-----------( 225      ( 12 Aug 2021 06:48 )             35.3     PT/INR - ( 12 Aug 2021 06:48 )   PT: 15.8 sec;   INR: 1.41 ratio         PTT - ( 12 Aug 2021 06:48 )  PTT:30.3 sec  12 Aug 2021 06:48    129<L>  |  92<L>  |  29<H>  ----------------------------<  119<H>  4.4     |  25     |  1.16     11 Aug 2021 07:54    132<L>  |  93<L>  |  36<H>  ----------------------------<  117<H>  4.7     |  25     |  1.32<H>    Ca    9.6        12 Aug 2021 06:48  Ca    9.6        11 Aug 2021 07:54  Phos  2.5       12 Aug 2021 06:48  Phos  3.1       11 Aug 2021 07:54  Mg     2.10      12 Aug 2021 06:48  Mg     2.10      11 Aug 2021 07:54

## 2021-08-12 NOTE — CHART NOTE - NSCHARTNOTEFT_GEN_A_CORE
Discussed case with Dr. Velázquez, plan to start Warfarin 3mg tonight without bridging.  Follow up appointment for INR check was scheduled with the pt's PCP, Dr. Camp, on Monday 8/16 at 11am, pt made aware and will follow up at that time.

## 2021-08-12 NOTE — PROGRESS NOTE ADULT - SUBJECTIVE AND OBJECTIVE BOX
CARDIOLOGY FOLLOW UP - Dr. Velázquez  Date of Service: 8/12/21  CC: denies cp, sob, and palpitations , feeling better     Review of Systems:  Constitutional: No fever, weight loss, or fatigue  Respiratory: No cough, wheezing, or hemoptysis, no shortness of breath  Cardiovascular: No chest pain, palpitations, passing out, dizziness, or leg swelling  Gastrointestinal: No abd or epigastric pain.  No nausea, vomiting, or hematemesis; no diarrhea or constipation, no melena or hematochezia  Vascular: no edema       PHYSICAL EXAM:  T(C): 36.8 (08-12-21 @ 06:26), Max: 36.8 (08-11-21 @ 20:48)  HR: 66 (08-12-21 @ 06:26) (66 - 86)  BP: 112/68 (08-12-21 @ 06:26) (99/64 - 118/74)  RR: 16 (08-12-21 @ 06:26) (16 - 18)  SpO2: 100% (08-12-21 @ 06:26) (98% - 100%)  Wt(kg): --  I&O's Summary    11 Aug 2021 07:01  -  12 Aug 2021 07:00  --------------------------------------------------------  IN: 836 mL / OUT: 2300 mL / NET: -1464 mL    12 Aug 2021 07:01  -  12 Aug 2021 10:12  --------------------------------------------------------  IN: 0 mL / OUT: 700 mL / NET: -700 mL        Appearance: Normal	  Cardiovascular: Normal S1 S2,RRR, No JVD, No murmurs  Respiratory: Lungs clear to auscultation	  Gastrointestinal:  Soft, Non-tender, + BS	  Extremities: Normal range of motion, No clubbing, cyanosis or edema      Home Medications:  furosemide 40 mg oral tablet: 1 tab(s) orally 2 times a day (11 Aug 2021 22:28)  Lasix 40 mg oral tablet: 1 tab(s) orally once a day (03 Aug 2021 21:43)  metOLazone 2.5 mg oral tablet: 1 tab(s) orally once a day (11 Aug 2021 22:28)  Plavix 75 mg oral tablet: 1 tab(s) orally once a day (03 Aug 2021 21:43)  timolol hemihydrate 0.5% ophthalmic solution: 1 drop(s) to each affected eye 2 times a day (03 Aug 2021 21:43)      MEDICATIONS  (STANDING):  atorvastatin 40 milliGRAM(s) Oral at bedtime  clopidogrel Tablet 75 milliGRAM(s) Oral daily  dextrose 40% Gel 15 Gram(s) Oral once  dextrose 50% Injectable 25 Gram(s) IV Push once  dextrose 50% Injectable 12.5 Gram(s) IV Push once  dextrose 50% Injectable 25 Gram(s) IV Push once  furosemide    Tablet 40 milliGRAM(s) Oral two times a day  metolazone 2.5 milliGRAM(s) Oral daily  metoprolol succinate ER 25 milliGRAM(s) Oral daily  pantoprazole    Tablet 40 milliGRAM(s) Oral before breakfast  timolol 0.5% Solution 1 Drop(s) Both EYES every 12 hours      TELEMETRY: NSR 	    ECG:  	  RADIOLOGY:   DIAGNOSTIC TESTING:  [ ] Echocardiogram:  [ ]  Catheterization:  [ ] Stress Test:    OTHER: 	    LABS:	 	                            11.7   7.86  )-----------( 225      ( 12 Aug 2021 06:48 )             35.3     08-12    129<L>  |  92<L>  |  29<H>  ----------------------------<  119<H>  4.4   |  25  |  1.16    Ca    9.6      12 Aug 2021 06:48  Phos  2.5     08-12  Mg     2.10     08-12      PT/INR - ( 12 Aug 2021 06:48 )   PT: 15.8 sec;   INR: 1.41 ratio         PTT - ( 12 Aug 2021 06:48 )  PTT:30.3 sec         CARDIOLOGY FOLLOW UP - Dr. Velázquez  Date of Service: 8/12/21  CC: denies cp, sob, and palpitations , feeling better     Review of Systems:  Constitutional: No fever, weight loss, or fatigue  Respiratory: No cough, wheezing, or hemoptysis, no shortness of breath  Cardiovascular: No chest pain, palpitations, passing out, dizziness, or leg swelling  Gastrointestinal: No abd or epigastric pain.  No nausea, vomiting, or hematemesis; no diarrhea or constipation, no melena or hematochezia  Vascular: no edema       PHYSICAL EXAM:  T(C): 36.8 (08-12-21 @ 06:26), Max: 36.8 (08-11-21 @ 20:48)  HR: 66 (08-12-21 @ 06:26) (66 - 86)  BP: 112/68 (08-12-21 @ 06:26) (99/64 - 118/74)  RR: 16 (08-12-21 @ 06:26) (16 - 18)  SpO2: 100% (08-12-21 @ 06:26) (98% - 100%)  Wt(kg): --  I&O's Summary    11 Aug 2021 07:01  -  12 Aug 2021 07:00  --------------------------------------------------------  IN: 836 mL / OUT: 2300 mL / NET: -1464 mL    12 Aug 2021 07:01  -  12 Aug 2021 10:12  --------------------------------------------------------  IN: 0 mL / OUT: 700 mL / NET: -700 mL        Appearance: Normal	  Cardiovascular: Normal S1 S2,RRR, No JVD, No murmurs  Respiratory: Lungs clear to auscultation	  Gastrointestinal:  Soft, Non-tender, + BS	  Extremities: Normal range of motion, No clubbing, cyanosis or edema      Home Medications:  furosemide 40 mg oral tablet: 1 tab(s) orally 2 times a day (11 Aug 2021 22:28)  Lasix 40 mg oral tablet: 1 tab(s) orally once a day (03 Aug 2021 21:43)  metOLazone 2.5 mg oral tablet: 1 tab(s) orally once a day (11 Aug 2021 22:28)  Plavix 75 mg oral tablet: 1 tab(s) orally once a day (03 Aug 2021 21:43)  timolol hemihydrate 0.5% ophthalmic solution: 1 drop(s) to each affected eye 2 times a day (03 Aug 2021 21:43)      MEDICATIONS  (STANDING):  atorvastatin 40 milliGRAM(s) Oral at bedtime  clopidogrel Tablet 75 milliGRAM(s) Oral daily  dextrose 40% Gel 15 Gram(s) Oral once  dextrose 50% Injectable 25 Gram(s) IV Push once  dextrose 50% Injectable 12.5 Gram(s) IV Push once  dextrose 50% Injectable 25 Gram(s) IV Push once  furosemide    Tablet 40 milliGRAM(s) Oral two times a day  metolazone 2.5 milliGRAM(s) Oral daily  metoprolol succinate ER 25 milliGRAM(s) Oral daily  pantoprazole    Tablet 40 milliGRAM(s) Oral before breakfast  timolol 0.5% Solution 1 Drop(s) Both EYES every 12 hours      TELEMETRY: NSR 	    ECG:  	  RADIOLOGY:   < from: Xray Chest 1 View AP/PA (08.11.21 @ 06:29) >  FINDINGS:  Status post aortic valve replacement and left ICD.  The heart size is not adequately assessed on single view..  The lungs are clear.  There is no pneumothorax or pleural effusion.  Degenerative changes.    IMPRESSION:  Clear lungs.    --- End of Report ---    < end of copied text >    DIAGNOSTIC TESTING:  [ ] Echocardiogram:  [ ]  Catheterization:  [ ] Stress Test:    OTHER: 	    LABS:	 	                            11.7   7.86  )-----------( 225      ( 12 Aug 2021 06:48 )             35.3     08-12    129<L>  |  92<L>  |  29<H>  ----------------------------<  119<H>  4.4   |  25  |  1.16    Ca    9.6      12 Aug 2021 06:48  Phos  2.5     08-12  Mg     2.10     08-12      PT/INR - ( 12 Aug 2021 06:48 )   PT: 15.8 sec;   INR: 1.41 ratio         PTT - ( 12 Aug 2021 06:48 )  PTT:30.3 sec

## 2021-08-12 NOTE — PROGRESS NOTE ADULT - PROVIDER SPECIALTY LIST ADULT
Cardiology
Hospitalist
Cardiology
Cardiology
Electrophysiology
Electrophysiology
Hospitalist
Hospitalist
Cardiology
Cardiology
Electrophysiology
Hospitalist
Cardiology
Cardiology
Hospitalist
Cardiology

## 2021-08-12 NOTE — DISCHARGE NOTE NURSING/CASE MANAGEMENT/SOCIAL WORK - PATIENT PORTAL LINK FT
You can access the FollowMyHealth Patient Portal offered by Health system by registering at the following website: [email or web address]. By joining [facility]’s FollowMyHealth portal, you will also be able to view your health information using other applications (apps) compatible with our system.

## 2021-08-12 NOTE — PROGRESS NOTE ADULT - TIME BILLING
.
Agree with above NP note.  CV stable  clinically improved with dobutamine, iv diuretics  await ICD, hold diuretics today
.
Agree with above NP note.  CV stable  s/p ICD  cont oral diuretics  start coumadin  DCP  Followup with me upon discharge
Agree with above NP note.  CV stable  clinically improved with dobutamine, iv diuretics  await ICD, cont po diuretics   trial off 
Agree with above NP note.  CV stable  s/p ICD  cont oral diuretics as ordered  start coumadin today  inr check on monday with pcp  d/c today
Patient seen and examined.  Agree with above NP note.  71 year old male known to our practice, PMH of  Hypertension, Coronary Artery Disease, S/P PCI ( s/p ROCIO placement to the proximal RCA and RPDA 6/15/2020, PCI to LAD 6/16/2020), Systolic Congestive Heart Failure, Aortic Stenosis, S/P bioprosthetic AVR , Paroxysmal Atrial Fibrillation, Prostate CA presenting with fluid overload, SOB    -remains overloaded  -inc laisx to 60 iv bid, add metol 2.5 qd  -check echo for ICD candidacy  -add bb, cont ARB  -cont a/c, xarelto on hold   dawood LE dopplers
agree with above NP note.  71 year old male known to our practice, PMH of  Hypertension, Coronary Artery Disease, S/P PCI ( s/p ROCIO placement to the proximal RCA and RPDA 6/15/2020, PCI to LAD 6/16/2020), Systolic Congestive Heart Failure, Aortic Stenosis, S/P bioprosthetic AVR , Paroxysmal Atrial Fibrillation, Prostate CA presenting with fluid overload, SOB  cv stable, remains overloaded  cont inc laisx 60 iv bid, metol 2.5 qd  start inotropic therapy, d/c bb  consider rhc if does not improve on inotropes  repeat echo with severe lv dysfxn  eps eval for icd for next week once med optimized  cont ARB  cont a/c, xarelto on hold  can d/c plavix, last pci 6/20, to min bleed risk with triple therapy

## 2021-08-12 NOTE — PROGRESS NOTE ADULT - ASSESSMENT
Cath 6/15/2020: s/p successful ROCIO placement to the proximal RCA and RPDA.  Cath 2020: s/p successful staged PCI to the mid LAD. Medical management of LCX disease.  Echo 2020: mild Mr, fx Bioprosthetic aortic valve replacement. EF 15-20%, Moderate concentric LVH. Severe global left ventricular systolic dysfunction. mild pulm htn    a/p  71 year old with  Hypertension, Coronary Artery Disease, S/P PCI ( s/p ROCIO placement to the proximal RCA and RPDA 6/15/2020, PCI to LAD 2020), Systolic Congestive Heart Failure, Aortic Stenosis, S/P bioprosthetic AVR , Paroxysmal Atrial Fibrillation, Prostate CA presenting with fluid overload, SOB    # Acute on Chronic Systolic Congestive Heart Failure  -volume status much improved w IV diureses and    -cr noted - cont PO Lasix 40 mg bid, cont metolazone 2.5 mg daily   -no further ectopy noted - cont bb   -hold arb for now given borderline bp   -s/p dobutamine 2.5mcg/KG  -echo with EF 14%, severe global LV dysfunction, decreased RV function  -s/p ICD - s/p iv abx    -strict i/o   -EP f/u     # Hypertension  -bp improved today   -cont low dose bb , lasix     #Coronary Artery Disease, S/P PCI.  -Recent Cath with Lcx disease and is s/p PCI to prox RCA, RPDA and LAD.  -Continue medical management for Lcx disease   -Stable without angina  -will cont only Plavix and hold asa given plans to resume Coumadin   -no acs     # Aortic Stenosis s/p BioAVR  -echo with functioning bioprosthetic AVR.     # Paroxysmal Atrial Fibrillation  -Stable and in NSR  -BB resumed   -ChadsVac= 4,  AC held for now - resume PO AC per EP -- pt prefers coumadin 2/2 price of Xarelto  -plan to start coumadin 3mg QD without outpt INR check monday      #Hyponatremia  -likely in setting of diureses  -repeat improved   -cont to monitor     dvt ppx    plan discussed with ACP   DCP for today  pt to f/u w Dr. Dann Velázquez - apt to follow    Cath 6/15/2020: s/p successful ROCIO placement to the proximal RCA and RPDA.  Cath 2020: s/p successful staged PCI to the mid LAD. Medical management of LCX disease.  Echo 2020: mild Mr, fx Bioprosthetic aortic valve replacement. EF 15-20%, Moderate concentric LVH. Severe global left ventricular systolic dysfunction. mild pulm htn    a/p  71 year old with  Hypertension, Coronary Artery Disease, S/P PCI ( s/p ROCIO placement to the proximal RCA and RPDA 6/15/2020, PCI to LAD 2020), Systolic Congestive Heart Failure, Aortic Stenosis, S/P bioprosthetic AVR , Paroxysmal Atrial Fibrillation, Prostate CA presenting with fluid overload, SOB    # Acute on Chronic Systolic Congestive Heart Failure  -volume status much improved w IV diureses and    -cr noted - cont PO Lasix 40 mg bid, cont metolazone 2.5 mg daily   -no further ectopy noted - cont bb   -hold arb for now given borderline bp   -s/p dobutamine 2.5mcg/KG  -echo with EF 14%, severe global LV dysfunction, decreased RV function  -s/p ICD - s/p iv abx    -strict i/o   -EP f/u     # Hypertension  -bp improved today   -cont low dose bb , lasix     #Coronary Artery Disease, S/P PCI.  -Recent Cath with Lcx disease and is s/p PCI to prox RCA, RPDA and LAD.  -Continue medical management for Lcx disease   -Stable without angina  -will cont only Plavix and hold asa given plans to resume Coumadin   -no acs     # Aortic Stenosis s/p BioAVR  -echo with functioning bioprosthetic AVR.     # Paroxysmal Atrial Fibrillation  -Stable and in NSR  -BB resumed   -ChadsVac= 4,  AC held for now - resume PO AC per EP -- pt prefers coumadin 2/2 price of Xarelto  -plan to start coumadin 3mg QD without outpt INR check monday      #Hyponatremia  -likely in setting of diureses  -repeat improved   -cont to monitor     dvt ppx    plan discussed with ACP   DCP for today  pt to f/u w Dr. Dann Velázquez -  @ 4pm    Cath 6/15/2020: s/p successful ROCIO placement to the proximal RCA and RPDA.  Cath 2020: s/p successful staged PCI to the mid LAD. Medical management of LCX disease.  Echo 2020: mild Mr, fx Bioprosthetic aortic valve replacement. EF 15-20%, Moderate concentric LVH. Severe global left ventricular systolic dysfunction. mild pulm htn    a/p  71 year old with  Hypertension, Coronary Artery Disease, S/P PCI ( s/p ROCIO placement to the proximal RCA and RPDA 6/15/2020, PCI to LAD 2020), Systolic Congestive Heart Failure, Aortic Stenosis, S/P bioprosthetic AVR , Paroxysmal Atrial Fibrillation, Prostate CA presenting with fluid overload, SOB    # Acute on Chronic Systolic Congestive Heart Failure  -volume status much improved w IV diureses and    -cr noted - cont PO Lasix 40 mg bid, cont metolazone 2.5 mg daily   -no further ectopy noted - cont bb   -hold arb for now given borderline bp   -s/p dobutamine 2.5mcg/KG  -echo with EF 14%, severe global LV dysfunction, decreased RV function  -s/p ICD - s/p iv abx    -strict i/o   -EP f/u     # Hypertension  -bp improved today   -cont low dose bb , lasix     #Coronary Artery Disease, S/P PCI.  -Recent Cath with Lcx disease and is s/p PCI to prox RCA, RPDA and LAD.  -Continue medical management for Lcx disease   -Stable without angina  -will cont only Plavix and hold asa given plans to resume Coumadin   -no acs     # Aortic Stenosis s/p BioAVR  -echo with functioning bioprosthetic AVR.     # Paroxysmal Atrial Fibrillation  -Stable and in NSR  -BB resumed   -ChadsVac= 4,  AC held for now s/p ICD -- pt prefers coumadin 2/2 price of Xarelto  -plan to start coumadin 3mg QD tonight with outpt INR check monday      #Hyponatremia  -likely in setting of diureses  -repeat improved   -cont to monitor     dvt ppx    plan discussed with ACP   DCP for today  pt to f/u w Dr. Dann Velázquez -  @ 4pm

## 2021-08-12 NOTE — PROGRESS NOTE ADULT - ASSESSMENT
Problem/Plan - 1:  ·  Problem: Acute on chronic diastolic congestive heart failure.  Plan: -patient with diastolic CHF, unsure of his baseline EF  -CAD s/p 4 stents  -last stent 1 year ago  -c/w asp/plavix started lipitor.   - diuresis as per cards     Problem/Plan - 2:  ·  Problem: AF (Paroxysmal Atrial Fibrillation).  Plan: - telemonitor  AC as per cards     Problem/Plan - 3:  ·  Problem: Other ascites.  Plan: -moderate ascites likely 2/2 CHF  -diuresis as above    Problem/Plan - 4:  ·  Problem: Essential hypertension.  Plan: -c/w losartan 100 mg.     Problem/Plan - 5:  ·  Problem: Chronic gastric ulcer, unspecified whether gastric ulcer hemorrhage or perforation present.  Plan: -no epigastric tenderness

## 2021-08-12 NOTE — PROGRESS NOTE ADULT - SUBJECTIVE AND OBJECTIVE BOX
Patient is a 71y old  Male who presents with a chief complaint of sob HF exacerbation (12 Aug 2021 10:11)    Date of servie : 08-12-21 @ 16:26  INTERVAL HPI/OVERNIGHT EVENTS:  T(C): 36.6 (08-12-21 @ 12:09), Max: 36.8 (08-11-21 @ 20:48)  HR: 62 (08-12-21 @ 12:09) (62 - 86)  BP: 103/62 (08-12-21 @ 12:09) (99/64 - 118/74)  RR: 16 (08-12-21 @ 12:09) (16 - 18)  SpO2: 97% (08-12-21 @ 12:09) (97% - 100%)  Wt(kg): --  I&O's Summary    11 Aug 2021 07:01  -  12 Aug 2021 07:00  --------------------------------------------------------  IN: 836 mL / OUT: 2300 mL / NET: -1464 mL    12 Aug 2021 07:01  -  12 Aug 2021 16:26  --------------------------------------------------------  IN: 0 mL / OUT: 700 mL / NET: -700 mL        LABS:                        11.7   7.86  )-----------( 225      ( 12 Aug 2021 06:48 )             35.3     08-12    129<L>  |  92<L>  |  29<H>  ----------------------------<  119<H>  4.4   |  25  |  1.16    Ca    9.6      12 Aug 2021 06:48  Phos  2.5     08-12  Mg     2.10     08-12      PT/INR - ( 12 Aug 2021 06:48 )   PT: 15.8 sec;   INR: 1.41 ratio         PTT - ( 12 Aug 2021 06:48 )  PTT:30.3 sec    CAPILLARY BLOOD GLUCOSE                MEDICATIONS  (STANDING):  atorvastatin 40 milliGRAM(s) Oral at bedtime  clopidogrel Tablet 75 milliGRAM(s) Oral daily  dextrose 40% Gel 15 Gram(s) Oral once  dextrose 50% Injectable 25 Gram(s) IV Push once  dextrose 50% Injectable 12.5 Gram(s) IV Push once  dextrose 50% Injectable 25 Gram(s) IV Push once  furosemide    Tablet 40 milliGRAM(s) Oral two times a day  metolazone 2.5 milliGRAM(s) Oral daily  metoprolol succinate ER 25 milliGRAM(s) Oral daily  pantoprazole    Tablet 40 milliGRAM(s) Oral before breakfast  timolol 0.5% Solution 1 Drop(s) Both EYES every 12 hours    MEDICATIONS  (PRN):  acetaminophen   Tablet .. 650 milliGRAM(s) Oral every 6 hours PRN Temp greater or equal to 38C (100.4F), Mild Pain (1 - 3), Moderate Pain (4 - 6)          PHYSICAL EXAM:  GENERAL: NAD, well-groomed, well-developed  HEAD:  Atraumatic, Normocephalic  CHEST/LUNG: Clear to percussion bilaterally; No rales, rhonchi, wheezing, or rubs  HEART: Regular rate and rhythm; No murmurs, rubs, or gallops  ABDOMEN: Soft, Nontender, Nondistended; Bowel sounds present  EXTREMITIES:  2+ Peripheral Pulses, No clubbing, cyanosis, or edema  LYMPH: No lymphadenopathy noted  SKIN: No rashes or lesions    Care Discussed with Consultants/Other Providers [ ] YES  [ ] NO

## 2021-08-12 NOTE — PROGRESS NOTE ADULT - NUTRITIONAL ASSESSMENT
This patient has been assessed with a concern for Malnutrition and has been determined to have a diagnosis/diagnoses of Severe protein-calorie malnutrition.    This patient is being managed with:   Diet DASH/TLC-  Sodium & Cholesterol Restricted  Entered: Aug 10 2021  5:32PM

## 2021-08-12 NOTE — PROGRESS NOTE ADULT - REASON FOR ADMISSION
sob HF exacerbation

## 2021-08-12 NOTE — CHART NOTE - NSCHARTNOTESELECT_GEN_ALL_CORE
ACP/Event Note
Event Note
Post Op
Chart Note/Nutrition Services
Event Note

## 2021-08-25 ENCOUNTER — APPOINTMENT (OUTPATIENT)
Dept: ELECTROPHYSIOLOGY | Facility: CLINIC | Age: 71
End: 2021-08-25
Payer: MEDICARE

## 2021-08-25 VITALS — RESPIRATION RATE: 14 BRPM | DIASTOLIC BLOOD PRESSURE: 57 MMHG | HEART RATE: 66 BPM | SYSTOLIC BLOOD PRESSURE: 107 MMHG

## 2021-08-25 DIAGNOSIS — I48.0 PAROXYSMAL ATRIAL FIBRILLATION: ICD-10-CM

## 2021-08-25 PROCEDURE — 99024 POSTOP FOLLOW-UP VISIT: CPT

## 2021-08-25 RX ORDER — FUROSEMIDE 40 MG/1
40 TABLET ORAL
Refills: 0 | Status: ACTIVE | COMMUNITY

## 2021-08-25 RX ORDER — CLOPIDOGREL 75 MG/1
75 TABLET, FILM COATED ORAL
Refills: 0 | Status: ACTIVE | COMMUNITY

## 2021-08-25 RX ORDER — PANTOPRAZOLE 40 MG/1
40 TABLET, DELAYED RELEASE ORAL
Refills: 0 | Status: ACTIVE | COMMUNITY

## 2021-08-25 RX ORDER — TIMOLOL MALEATE 5 MG/ML
0.5 SOLUTION OPHTHALMIC
Refills: 0 | Status: ACTIVE | COMMUNITY

## 2021-08-25 RX ORDER — WARFARIN 3 MG/1
3 TABLET ORAL
Refills: 0 | Status: ACTIVE | COMMUNITY

## 2021-09-15 ENCOUNTER — APPOINTMENT (OUTPATIENT)
Dept: ELECTROPHYSIOLOGY | Facility: CLINIC | Age: 71
End: 2021-09-15

## 2021-10-20 ENCOUNTER — APPOINTMENT (OUTPATIENT)
Dept: ELECTROPHYSIOLOGY | Facility: CLINIC | Age: 71
End: 2021-10-20
Payer: MEDICARE

## 2021-10-20 VITALS — DIASTOLIC BLOOD PRESSURE: 60 MMHG | HEART RATE: 89 BPM | SYSTOLIC BLOOD PRESSURE: 96 MMHG

## 2021-10-20 PROCEDURE — 93283 PRGRMG EVAL IMPLANTABLE DFB: CPT

## 2021-10-20 RX ORDER — METOLAZONE 2.5 MG/1
2.5 TABLET ORAL DAILY
Refills: 0 | Status: ACTIVE | COMMUNITY

## 2021-10-20 RX ORDER — ATORVASTATIN CALCIUM 40 MG/1
40 TABLET, FILM COATED ORAL
Refills: 0 | Status: ACTIVE | COMMUNITY

## 2021-10-20 RX ORDER — METOPROLOL SUCCINATE 25 MG/1
25 TABLET, EXTENDED RELEASE ORAL DAILY
Refills: 0 | Status: ACTIVE | COMMUNITY

## 2022-04-20 ENCOUNTER — APPOINTMENT (OUTPATIENT)
Dept: ELECTROPHYSIOLOGY | Facility: CLINIC | Age: 72
End: 2022-04-20
Payer: MEDICARE

## 2022-04-20 VITALS — RESPIRATION RATE: 14 BRPM | DIASTOLIC BLOOD PRESSURE: 64 MMHG | SYSTOLIC BLOOD PRESSURE: 105 MMHG | HEART RATE: 75 BPM

## 2022-04-20 DIAGNOSIS — I50.20 UNSPECIFIED SYSTOLIC (CONGESTIVE) HEART FAILURE: ICD-10-CM

## 2022-04-20 PROCEDURE — 93283 PRGRMG EVAL IMPLANTABLE DFB: CPT

## 2022-07-18 NOTE — ED PROVIDER NOTE - PROGRESS NOTE DETAILS
Text page was sent to the Tele PA to inform them of the patient's admission. The page included the patien's name, MR number, admitting doctor, as well as diagnosis. My call back number was included for any follow up questions. regular

## 2022-09-28 NOTE — DIETITIAN INITIAL EVALUATION ADULT. - ADD RECOMMEND
Patient seen 9/22. Rx refilled.   1) Monitor weights, PO intake/diet tolerance, skin integrity, pertinent labs.  2) Diet education as indicated/requested.

## 2023-02-23 NOTE — H&P ADULT - NSICDXPILOT_GEN_ALL_CORE
[place] Dutasteride Counseling: Dustasteride Counseling:  I discussed with the patient the risks of use of dutasteride including but not limited to decreased libido, decreased ejaculate volume, and gynecomastia. Women who can become pregnant should not handle medication.  All of the patient's questions and concerns were addressed. Dutasteride Male Counseling: Dustasteride Counseling:  I discussed with the patient the risks of use of dutasteride including but not limited to decreased libido, decreased ejaculate volume, and gynecomastia. Women who can become pregnant should not handle medication.  All of the patient's questions and concerns were addressed.

## 2023-07-10 NOTE — DISCHARGE NOTE NURSING/CASE MANAGEMENT/SOCIAL WORK - NSDCVIVACCINE_GEN_ALL_CORE_FT
[facility]NCOUN[facility]  FAMILY MEDICINE OFFICE VISIT    CHIEF COMPLAINT:  Follow-up (6 month/) and Office Visit       HISTORY OF PRESENT ILLNESS:    Sherif Huang is a pleasant 70 year old male who presents today for:    DM: Onset was chronic. Patient reports good compliance with prescribed regimen. Denies any medication side effects. Currently asymptomatic. Denies any new concerns at this time.    HLD: Onset was chronic. Patient reports good compliance with prescribed regimen. Denies any medication side effects. Currently asymptomatic. Denies any new concerns at this time.     Lump on his arm which does not bother him mostly, may hurt sometimes when he inverts his arm. He does not like how it looks. It has been present for about 3-4 months, not red or hot.        PROBLEM LIST:    Patient Active Problem List   Diagnosis   • Erectile dysfunction   • NICM (nonischemic cardiomyopathy) (CMS/HCC)   • VT (ventricular tachycardia)   • [employer] Bi[employer] ICD 8/12/2015, battery RECALL 10/11/2016   • LBBB (left bundle branch block)   • NSVT (nonsustained ventricular tachycardia) (CMD)   • LVH (left ventricular hypertrophy)   • STEVEN on CPAP   • RLS (restless legs syndrome)   • Heart failure with reduced ejection fraction (CMD)   • Insomnia   • High risk medication use - Flecainide   • Type 2 diabetes mellitus without complication, without long-term current use of insulin (CMD)   • Hyperlipemia, mixed   • Splenomegaly   • Gastroesophageal reflux disease   • Chronic cough   • Fatty liver   • Hearing aid worn   • Family history of prostate cancer in brothers   • Complex sleep apnea syndrome   • Hypersomnia with sleep apnea   • AF (atrial fibrillation) (CMD)       HISTORIES:    I have reviewed the past medical history, family history, social history, medications and allergies listed in the medical record as obtained by my nursing staff and support staff and agree with their documentation.  ALLERGIES:   Allergen  Reactions   • Penicillins RASH     Current Outpatient Medications   Medication Sig Dispense Refill   • hydrOXYzine (ATARAX) 25 MG tablet Take 1-2 tabs nightly as needed for sleep 90 tablet 1   • metFORMIN (GLUCOPHAGE-XR) 500 MG 24 hr tablet Take 1 tablet by mouth daily (with breakfast). 90 tablet 1   • atorvastatin (LIPITOR) 10 MG tablet Take 1 tablet by mouth daily. 90 tablet 1   • omeprazole (PriLOSEC) 40 MG capsule Take 1 capsule by mouth daily. 30-60 mins before breakfast 90 capsule 1   • pregabalin (LYRICA) 50 MG capsule Take 1 capsule by mouth nightly. For restless leg symptoms. 30 capsule 5   • benazepril (LOTENSIN) 5 MG tablet Take 1 tablet by mouth in the morning and 1 tablet in the evening. 90 tablet 3   • HYDROcodone-acetaminophen (NORCO) 5-325 MG per tablet      • pramipexole (MIRAPEX) 0.5 MG tablet TAKE ONE TABLET BY MOUTH TWICE A DAY IN THE MORNING AND BEFORE BEDTIME 180 tablet 1   • flecainide (TAMBOCOR) 50 MG tablet Take 1 tablet by mouth in the morning and 1 tablet in the evening. 180 tablet 1   • Melatonin 10 MG Tab Take 20 mg by mouth nightly. 90 tablet 3   • metoPROLOL succinate (TOPROL-XL) 25 MG 24 hr tablet TAKE ONE TABLET BY MOUTH DAILY 90 tablet 3   • hydroCORTisone (CORTIZONE) 2.5 % cream Apply 1 application topically 3 times daily. 15 g 2   • polyethylene glycol (MIRALAX) 17 g packet Take 17 g by mouth daily. Stir and dissolve powder in any 4 to 8 ounces of beverage, then drink.     • Multiple Vitamins-Minerals (MULTIVITAMIN ADULTS PO) Take by mouth daily.     • Blood Glucose Monitoring Suppl (ACCU-CHEK GUIDE ME) w/Device Kit 1 each daily. 1 kit 0   • ACCU-CHEK GUIDE test strip Test blood sugar 1 times daily as directed. Diagnosis: E11.9. Meter: Guide Me 50 strip 6   • ACCU-CHEK FASTCLIX LANCETS Misc Test blood sugars 1 time daily. 102 each 6   • vardenafil (LEVITRA) 20 MG tablet Take 1 tablet by mouth as needed for Erectile Dysfunction. 90 tablet 0     No current facility-administered  medications for this visit.       REVIEW OF SYSTEMS:    No pertinent positive findings with regards to review of systems other than the information discussed in the HPI.     PHYSICAL EXAM:    Vital Signs:    Visit Vitals  /52   Pulse 61   Wt 94.9 kg (209 lb 3.2 oz)   BMI 30.02 kg/m²     General:   Alert, cooperative, conversive in no acute distress.  Skin:  Warm and dry without rash.    Head:  Normocephalic, atraumatic.   Neck:  Trachea is midline.     Eyes:  Normal conjunctivae and sclerae. EOMI. No scleral icterus.  ENT:   Moist oral (mouth) mucosa.  Cardiovascular:  Symmetrical pulses.  Regular rate and rhythm without murmur.  Respiratory:   Normal respiratory effort.  Clear to auscultation.  No wheezes, rales or rhonchi.  Musculoskeletal:  No deformity or edema. Normal gait. Lump in the antecubital fossa of the R arm, no redness, fluctuant   Back:  Normal alignment.    Neurologic:  Oriented x4.  No focal deficits.  Psychiatric:  Cooperative.  Appropriate mood and affect.    LABORATORY DATA:    If applicable, pertinent labs reviewed.    IMAGING STUDIES:    If applicable, pertinent imaging reviewed.    ASSESSMENT:    1. Type 2 diabetes mellitus without complication, without long-term current use of insulin (CMD)    2. Family history of prostate cancer in brothers    3. Need for vaccination    4. Left groin pain    5. Skin lump of arm, left    6. Medicare annual wellness visit, subsequent    7. Routine physical examination    8. Insomnia, unspecified type    9. NICM (nonischemic cardiomyopathy) (CMD)    10. Hyperlipemia, mixed    11. Gastroesophageal reflux disease, unspecified whether esophagitis present    12. Chronic cough        PLAN:    Orders Placed This Encounter   • XR HIPS 2 VIEWS BILATERAL   • US SOFT TISSUE UPPER EXTREMITY RIGHT   • Zoster Shingles Recombinant Adjuvanted (Shingrix) 2-Dose Series - [id number]   • CBC with Automated Differential   • Comprehensive Metabolic Panel   • Lipid Panel With  Reflex   • Glycohemoglobin   • PSA   • Microalbumin Urine Random   • hydrOXYzine (ATARAX) 25 MG tablet   • metFORMIN (GLUCOPHAGE-XR) 500 MG 24 hr tablet   • atorvastatin (LIPITOR) 10 MG tablet   • omeprazole (PriLOSEC) 40 MG capsule         DISCUSSION:    1. Med Management:  - Eye Exam: recommend annually  - Colonoscopy: due in Sept 2025  - PSA: done Jan 2023; family history of prostate cancer in brothers  - Immunizations: up to date     2. Heart failure with reduced ejection fraction:   - follows with Dr. Weir  - on Benzepril 5 mg BID  - on Metoprolol 25 mg daily     3. NICM (nonischemic cardiomyopathy) s/p biventricular pacemaker and implantable cardioverter-defibrillator (ICD):  - follows with Dr. Weir  - on Benzepril 5 mg BID  - on Metoprolol 25 mg daily  - atorvastatin 10 mg daily     4. [employer] ICD 8/12/2015, battery RECALL 10/11/2016:   - follows with Dr. Cardona, EP  - on Metoprolol 25 mg daily  - on flecainide 50 mg BID     5. Ventricular tachycardia: history of this, currently stable   - follows with Dr. Cardona, EP  - biventricular pacemaker and implantable cardioverter-defibrillator (ICD) see above  - on Metoprolol 25 mg daily  - on flecainide 50 mg BID     6. NSVT (nonsustained ventricular tachycardia)   - follows with Dr. Cardona, EP  - biventricular pacemaker and implantable cardioverter-defibrillator (ICD)r, see above  - on Metoprolol 25 mg daily  - on flecainide 50 mg BID     7. LBBB (left bundle branch block)   - follows with Dr. Cardona, EP  - on Metoprolol 25 mg daily     8. LVH (left ventricular hypertrophy)  - follows with Dr. Weir     9. Hyperlipemia, mixed: LDL 67 Jan2023  - atorvastatin 10 mg nightly     10. Type 2 diabetes mellitus without complication, without long-term current use of insulin:  - Current A1C: June 23 5.8  - Past A1Cs (need minimum of 2 in 12 months): Jan 23 5.8  - Initial Diagnostic A1C: N/A  - Urine Microalbumin:   Microalbumin/ Creatinine Ratio (mg/g)   Date  Value   01/13/2023 9.4     - On a ACE/Arb?: Yes  - On a statin?: Yes  - Last Eye Exam: needs to schedule  - Current Medication Regimen: Metformin 500 mg daily  - Changes: none     11. STEVEN on CPAP:  - follows with Dr. Alfonso     12. Splenomegaly:  - previously saw Heme, now only needs to see if sx develop     13. RLS (restless legs syndrome):  - Follows with Dr. Alfonso  - Mirapex 0.5 mg twice daily  - off Gabapentin 200 mg, on lyrica per Dr. Burns   - also seeing Dr. Burns, concern for Parkinson's?    #. Memory loss:  - seeing Dr. Burns  - CT pending     14. Insomnia, unspecified type:  uncontrolled  - off Gabapentin 200 mg, on lyrica per Dr. Burns  - getting vivid dreams, hard to sleep  - trazodone made him jittery  - melatonin helps still, so encouraged him to take this  - try hydroxyzine if needed, do not take if new lyrica dose makes you sedated     #. Gastroesophageal reflux disease, unspecified whether esophagitis present: improved  - omeprazole 40 mg and take in morning before breakfast     #. Chronic cough: improved  - has improved with omeprazole 40 mg and take in morning before breakfast  - if still present or worsening, may need to stop ace inhibitor     #. Fatty liver:  - US in 2019    #. L groin pain:  - saw Urology, has long standing varicoceles which do not seem to be the reason for inguinal, groin pain  - gen surg did not appreciate hernia and none seen on CT scan; he will get an groin injection  - XR Thoracic, Lumbar June 2023  - he feels like he leans to the left, feels better if he leans to the left  - XR hip pending  - update me if not better and we can consider PT based on XR hip too    #. R lump of arm:  - US pending     #. Bilateral senorsineural hearing loss s/p hearing aids:  - stable     #. Skin lesions on R 3rd toe:  - will see Derm about this     #. Family history of prostate cancer in brothers:  - patient also has strong history of pancreatic cancer but he did do a genetic test and does NOT  have the marker for pancreatic cancer     #. Body mass index is 30.02 kg/m².:   - weight gain again, he has been having some health issues recently        FOLLOW UP:    Return in 6 months (on 1/10/2024) for or after for CPX and follow up.      The patient and/or parent/guardian/caregiver/family member/accompanying party indicated understanding of the diagnosis and agreed with the plan of care.    No Vaccines Administered.

## 2025-01-14 NOTE — ED ADULT NURSE NOTE - NS PRO PASSIVE SMOKE EXP
Health Maintenance Due   Topic Date Due    HEPATITIS C SCREENING  Never done    INFLUENZA VACCINE  Never done    COVID-19 Vaccine (1 - 2024-25 season) Never done    ANNUAL PHYSICAL  10/19/2024    BMI FOLLOWUP  10/19/2024      
Unknown
